# Patient Record
Sex: MALE | Race: BLACK OR AFRICAN AMERICAN | Employment: UNEMPLOYED | URBAN - METROPOLITAN AREA
[De-identification: names, ages, dates, MRNs, and addresses within clinical notes are randomized per-mention and may not be internally consistent; named-entity substitution may affect disease eponyms.]

---

## 2022-01-12 ENCOUNTER — HOSPITAL ENCOUNTER (INPATIENT)
Facility: HOSPITAL | Age: 42
LOS: 14 days | Discharge: NURSING, REHAB OR LONG-TERM CARE | DRG: 853 | End: 2022-01-27
Attending: EMERGENCY MEDICINE | Admitting: INTERNAL MEDICINE
Payer: MEDICAID

## 2022-01-12 DIAGNOSIS — T33.822A FROSTBITE OF BOTH FEET, INITIAL ENCOUNTER: Primary | ICD-10-CM

## 2022-01-12 DIAGNOSIS — T33.821A FROSTBITE OF BOTH FEET, INITIAL ENCOUNTER: Primary | ICD-10-CM

## 2022-01-12 DIAGNOSIS — I96 GANGRENE OF TOE OF BOTH FEET (HCC): ICD-10-CM

## 2022-01-12 DIAGNOSIS — I96 GANGRENE (HCC): ICD-10-CM

## 2022-01-12 LAB
ANION GAP SERPL CALC-SCNC: 8 MMOL/L (ref 0–18)
BUN BLD-MCNC: 7 MG/DL (ref 7–18)
BUN/CREAT SERPL: 10.1 (ref 10–20)
CALCIUM BLD-MCNC: 9.1 MG/DL (ref 8.5–10.1)
CHLORIDE SERPL-SCNC: 95 MMOL/L (ref 98–112)
CO2 SERPL-SCNC: 30 MMOL/L (ref 21–32)
CREAT BLD-MCNC: 0.69 MG/DL
GLUCOSE BLD-MCNC: 105 MG/DL (ref 70–99)
OSMOLALITY SERPL CALC.SUM OF ELEC: 274 MOSM/KG (ref 275–295)
POTASSIUM SERPL-SCNC: 3.2 MMOL/L (ref 3.5–5.1)
SARS-COV-2 RNA RESP QL NAA+PROBE: NOT DETECTED
SODIUM SERPL-SCNC: 133 MMOL/L (ref 136–145)

## 2022-01-13 ENCOUNTER — APPOINTMENT (OUTPATIENT)
Dept: CT IMAGING | Facility: HOSPITAL | Age: 42
DRG: 853 | End: 2022-01-13
Attending: NURSE PRACTITIONER
Payer: MEDICAID

## 2022-01-13 ENCOUNTER — APPOINTMENT (OUTPATIENT)
Dept: GENERAL RADIOLOGY | Facility: HOSPITAL | Age: 42
DRG: 853 | End: 2022-01-13
Attending: EMERGENCY MEDICINE
Payer: MEDICAID

## 2022-01-13 ENCOUNTER — APPOINTMENT (OUTPATIENT)
Dept: GENERAL RADIOLOGY | Facility: HOSPITAL | Age: 42
DRG: 853 | End: 2022-01-13
Attending: NURSE PRACTITIONER
Payer: MEDICAID

## 2022-01-13 ENCOUNTER — ANESTHESIA (OUTPATIENT)
Dept: SURGERY | Facility: HOSPITAL | Age: 42
DRG: 853 | End: 2022-01-13
Payer: MEDICAID

## 2022-01-13 ENCOUNTER — ANESTHESIA EVENT (OUTPATIENT)
Dept: SURGERY | Facility: HOSPITAL | Age: 42
DRG: 853 | End: 2022-01-13
Payer: MEDICAID

## 2022-01-13 ENCOUNTER — APPOINTMENT (OUTPATIENT)
Dept: GENERAL RADIOLOGY | Facility: HOSPITAL | Age: 42
DRG: 853 | End: 2022-01-13
Attending: PODIATRIST
Payer: MEDICAID

## 2022-01-13 PROBLEM — T33.822A FROSTBITE OF BOTH FEET, INITIAL ENCOUNTER: Status: ACTIVE | Noted: 2022-01-13

## 2022-01-13 PROBLEM — I96 GANGRENE OF TOE OF BOTH FEET (HCC): Status: ACTIVE | Noted: 2022-01-13

## 2022-01-13 PROBLEM — T33.821A FROSTBITE OF BOTH FEET, INITIAL ENCOUNTER: Status: ACTIVE | Noted: 2022-01-13

## 2022-01-13 LAB
ALBUMIN SERPL-MCNC: 2 G/DL (ref 3.4–5)
ALP LIVER SERPL-CCNC: 193 U/L
ALT SERPL-CCNC: 129 U/L
ANION GAP SERPL CALC-SCNC: 11 MMOL/L (ref 0–18)
APTT PPP: 35.9 SECONDS (ref 23.3–35.6)
AST SERPL-CCNC: 79 U/L (ref 15–37)
BASOPHILS # BLD: 0 X10(3) UL (ref 0–0.2)
BASOPHILS NFR BLD: 0 %
BILIRUB DIRECT SERPL-MCNC: 0.1 MG/DL (ref 0–0.2)
BILIRUB SERPL-MCNC: 0.5 MG/DL (ref 0.1–2)
BUN BLD-MCNC: 5 MG/DL (ref 7–18)
BUN/CREAT SERPL: 8.1 (ref 10–20)
CALCIUM BLD-MCNC: 8.9 MG/DL (ref 8.5–10.1)
CHLORIDE SERPL-SCNC: 101 MMOL/L (ref 98–112)
CO2 SERPL-SCNC: 24 MMOL/L (ref 21–32)
CREAT BLD-MCNC: 0.62 MG/DL
CRP SERPL-MCNC: 32 MG/DL (ref ?–0.3)
DEPRECATED HBV CORE AB SER IA-ACNC: 1570.6 NG/ML
DEPRECATED RDW RBC AUTO: 42.2 FL (ref 35.1–46.3)
DEPRECATED RDW RBC AUTO: 45.2 FL (ref 35.1–46.3)
EOSINOPHIL # BLD: 0.48 X10(3) UL (ref 0–0.7)
EOSINOPHIL NFR BLD: 2 %
ERYTHROCYTE [DISTWIDTH] IN BLOOD BY AUTOMATED COUNT: 13.9 % (ref 11–15)
ERYTHROCYTE [DISTWIDTH] IN BLOOD BY AUTOMATED COUNT: 14.5 % (ref 11–15)
EST. AVERAGE GLUCOSE BLD GHB EST-MCNC: 117 MG/DL (ref 68–126)
ETHANOL SERPL-MCNC: <3 MG/DL (ref ?–3)
GLUCOSE BLD-MCNC: 83 MG/DL (ref 70–99)
GLUCOSE BLDC GLUCOMTR-MCNC: 80 MG/DL (ref 70–99)
GLUCOSE BLDC GLUCOMTR-MCNC: 80 MG/DL (ref 70–99)
GLUCOSE BLDC GLUCOMTR-MCNC: 98 MG/DL (ref 70–99)
HBA1C MFR BLD: 5.7 % (ref ?–5.7)
HCT VFR BLD AUTO: 25.2 %
HCT VFR BLD AUTO: 26.1 %
HGB BLD-MCNC: 8.6 G/DL
HGB BLD-MCNC: 9.2 G/DL
HIV 1+2 AB+HIV1 P24 AG SERPL QL IA: NONREACTIVE
INR BLD: 1.2 (ref 0.8–1.2)
IRON SATN MFR SERPL: 14 %
IRON SERPL-MCNC: 23 UG/DL
LYMPHOCYTES NFR BLD: 0.72 X10(3) UL (ref 1–4)
LYMPHOCYTES NFR BLD: 3 %
MAGNESIUM SERPL-MCNC: 1.9 MG/DL (ref 1.6–2.6)
MCH RBC QN AUTO: 29.1 PG (ref 26–34)
MCH RBC QN AUTO: 29.3 PG (ref 26–34)
MCHC RBC AUTO-ENTMCNC: 34.1 G/DL (ref 31–37)
MCHC RBC AUTO-ENTMCNC: 35.2 G/DL (ref 31–37)
MCV RBC AUTO: 82.6 FL
MCV RBC AUTO: 85.7 FL
MONOCYTES # BLD: 1.21 X10(3) UL (ref 0.1–1)
MONOCYTES NFR BLD: 5 %
NEUTROPHILS # BLD AUTO: 21.59 X10 (3) UL (ref 1.5–7.7)
NEUTROPHILS NFR BLD: 86 %
NEUTS BAND NFR BLD: 4 %
NEUTS HYPERSEG # BLD: 21.69 X10(3) UL (ref 1.5–7.7)
OSMOLALITY SERPL CALC.SUM OF ELEC: 278 MOSM/KG (ref 275–295)
PLATELET # BLD AUTO: 539 10(3)UL (ref 150–450)
PLATELET # BLD AUTO: 556 10(3)UL (ref 150–450)
POTASSIUM SERPL-SCNC: 3.4 MMOL/L (ref 3.5–5.1)
PROCALCITONIN SERPL-MCNC: 1.05 NG/ML (ref ?–0.16)
PROT SERPL-MCNC: 7.2 G/DL (ref 6.4–8.2)
PROTHROMBIN TIME: 15.4 SECONDS (ref 11.6–14.8)
RBC # BLD AUTO: 2.94 X10(6)UL
RBC # BLD AUTO: 3.16 X10(6)UL
SODIUM SERPL-SCNC: 136 MMOL/L (ref 136–145)
TIBC SERPL-MCNC: 170 UG/DL (ref 240–450)
TOTAL CELLS COUNTED BLD: 100
TRANSFERRIN SERPL-MCNC: 114 MG/DL (ref 200–360)
VIT B12 SERPL-MCNC: 507 PG/ML (ref 193–986)
WBC # BLD AUTO: 20.4 X10(3) UL (ref 4–11)
WBC # BLD AUTO: 24.1 X10(3) UL (ref 4–11)

## 2022-01-13 PROCEDURE — 0Y6Q0Z0 DETACHMENT AT LEFT 1ST TOE, COMPLETE, OPEN APPROACH: ICD-10-PCS | Performed by: PODIATRIST

## 2022-01-13 PROCEDURE — 0Y6P0Z0 DETACHMENT AT RIGHT 1ST TOE, COMPLETE, OPEN APPROACH: ICD-10-PCS | Performed by: PODIATRIST

## 2022-01-13 PROCEDURE — 0Y6Y0Z0 DETACHMENT AT LEFT 5TH TOE, COMPLETE, OPEN APPROACH: ICD-10-PCS | Performed by: PODIATRIST

## 2022-01-13 PROCEDURE — 73630 X-RAY EXAM OF FOOT: CPT | Performed by: EMERGENCY MEDICINE

## 2022-01-13 PROCEDURE — 73630 X-RAY EXAM OF FOOT: CPT | Performed by: PODIATRIST

## 2022-01-13 PROCEDURE — 0Y6W0Z0 DETACHMENT AT LEFT 4TH TOE, COMPLETE, OPEN APPROACH: ICD-10-PCS | Performed by: PODIATRIST

## 2022-01-13 PROCEDURE — 0Y6U0Z0 DETACHMENT AT LEFT 3RD TOE, COMPLETE, OPEN APPROACH: ICD-10-PCS | Performed by: PODIATRIST

## 2022-01-13 PROCEDURE — 71045 X-RAY EXAM CHEST 1 VIEW: CPT | Performed by: NURSE PRACTITIONER

## 2022-01-13 PROCEDURE — 70450 CT HEAD/BRAIN W/O DYE: CPT | Performed by: NURSE PRACTITIONER

## 2022-01-13 PROCEDURE — 0Y6S0Z0 DETACHMENT AT LEFT 2ND TOE, COMPLETE, OPEN APPROACH: ICD-10-PCS | Performed by: PODIATRIST

## 2022-01-13 PROCEDURE — 0Y6X0Z0 DETACHMENT AT RIGHT 5TH TOE, COMPLETE, OPEN APPROACH: ICD-10-PCS | Performed by: PODIATRIST

## 2022-01-13 PROCEDURE — 99223 1ST HOSP IP/OBS HIGH 75: CPT | Performed by: OTHER

## 2022-01-13 RX ORDER — PANTOPRAZOLE SODIUM 40 MG/1
40 TABLET, DELAYED RELEASE ORAL
Status: DISCONTINUED | OUTPATIENT
Start: 2022-01-13 | End: 2022-01-21

## 2022-01-13 RX ORDER — BUPIVACAINE HYDROCHLORIDE 5 MG/ML
INJECTION, SOLUTION EPIDURAL; INTRACAUDAL AS NEEDED
Status: DISCONTINUED | OUTPATIENT
Start: 2022-01-13 | End: 2022-01-13 | Stop reason: HOSPADM

## 2022-01-13 RX ORDER — VANCOMYCIN HYDROCHLORIDE
25 ONCE
Status: COMPLETED | OUTPATIENT
Start: 2022-01-13 | End: 2022-01-13

## 2022-01-13 RX ORDER — NALOXONE HYDROCHLORIDE 0.4 MG/ML
80 INJECTION, SOLUTION INTRAMUSCULAR; INTRAVENOUS; SUBCUTANEOUS AS NEEDED
Status: DISCONTINUED | OUTPATIENT
Start: 2022-01-13 | End: 2022-01-13 | Stop reason: HOSPADM

## 2022-01-13 RX ORDER — ONDANSETRON 2 MG/ML
4 INJECTION INTRAMUSCULAR; INTRAVENOUS ONCE AS NEEDED
Status: DISCONTINUED | OUTPATIENT
Start: 2022-01-13 | End: 2022-01-13 | Stop reason: HOSPADM

## 2022-01-13 RX ORDER — HALOPERIDOL 5 MG/ML
0.25 INJECTION INTRAMUSCULAR ONCE AS NEEDED
Status: DISCONTINUED | OUTPATIENT
Start: 2022-01-13 | End: 2022-01-13 | Stop reason: HOSPADM

## 2022-01-13 RX ORDER — LORAZEPAM 2 MG/ML
1 INJECTION INTRAMUSCULAR
Status: DISCONTINUED | OUTPATIENT
Start: 2022-01-13 | End: 2022-01-27

## 2022-01-13 RX ORDER — HYDROMORPHONE HYDROCHLORIDE 1 MG/ML
0.2 INJECTION, SOLUTION INTRAMUSCULAR; INTRAVENOUS; SUBCUTANEOUS EVERY 5 MIN PRN
Status: DISCONTINUED | OUTPATIENT
Start: 2022-01-13 | End: 2022-01-13 | Stop reason: HOSPADM

## 2022-01-13 RX ORDER — MELATONIN
100 DAILY
Status: DISCONTINUED | OUTPATIENT
Start: 2022-01-14 | End: 2022-01-27

## 2022-01-13 RX ORDER — ALBUTEROL SULFATE 90 UG/1
2 AEROSOL, METERED RESPIRATORY (INHALATION) EVERY 4 HOURS PRN
Status: DISCONTINUED | OUTPATIENT
Start: 2022-01-13 | End: 2022-01-27

## 2022-01-13 RX ORDER — HYDROCODONE BITARTRATE AND ACETAMINOPHEN 5; 325 MG/1; MG/1
2 TABLET ORAL AS NEEDED
Status: DISCONTINUED | OUTPATIENT
Start: 2022-01-13 | End: 2022-01-13 | Stop reason: HOSPADM

## 2022-01-13 RX ORDER — HYDROMORPHONE HYDROCHLORIDE 1 MG/ML
0.6 INJECTION, SOLUTION INTRAMUSCULAR; INTRAVENOUS; SUBCUTANEOUS EVERY 5 MIN PRN
Status: DISCONTINUED | OUTPATIENT
Start: 2022-01-13 | End: 2022-01-13 | Stop reason: HOSPADM

## 2022-01-13 RX ORDER — MORPHINE SULFATE 4 MG/ML
4 INJECTION, SOLUTION INTRAMUSCULAR; INTRAVENOUS EVERY 10 MIN PRN
Status: DISCONTINUED | OUTPATIENT
Start: 2022-01-13 | End: 2022-01-13 | Stop reason: HOSPADM

## 2022-01-13 RX ORDER — GENTAMICIN SULFATE 40 MG/ML
INJECTION, SOLUTION INTRAMUSCULAR; INTRAVENOUS AS NEEDED
Status: DISCONTINUED | OUTPATIENT
Start: 2022-01-13 | End: 2022-01-13 | Stop reason: HOSPADM

## 2022-01-13 RX ORDER — SODIUM CHLORIDE 9 MG/ML
INJECTION, SOLUTION INTRAVENOUS CONTINUOUS
Status: DISCONTINUED | OUTPATIENT
Start: 2022-01-13 | End: 2022-01-13

## 2022-01-13 RX ORDER — ALBUTEROL SULFATE 90 UG/1
2 AEROSOL, METERED RESPIRATORY (INHALATION) EVERY 6 HOURS PRN
COMMUNITY

## 2022-01-13 RX ORDER — HYDROMORPHONE HYDROCHLORIDE 1 MG/ML
0.4 INJECTION, SOLUTION INTRAMUSCULAR; INTRAVENOUS; SUBCUTANEOUS EVERY 5 MIN PRN
Status: DISCONTINUED | OUTPATIENT
Start: 2022-01-13 | End: 2022-01-13 | Stop reason: HOSPADM

## 2022-01-13 RX ORDER — GUAIFENESIN 100 MG/5ML
100 SOLUTION ORAL EVERY 4 HOURS PRN
Status: DISCONTINUED | OUTPATIENT
Start: 2022-01-13 | End: 2022-01-27

## 2022-01-13 RX ORDER — SODIUM CHLORIDE 9 MG/ML
INJECTION, SOLUTION INTRAVENOUS ONCE
Status: COMPLETED | OUTPATIENT
Start: 2022-01-13 | End: 2022-01-13

## 2022-01-13 RX ORDER — MULTIPLE VITAMINS W/ MINERALS TAB 9MG-400MCG
1 TAB ORAL DAILY
Status: DISCONTINUED | OUTPATIENT
Start: 2022-01-13 | End: 2022-01-27

## 2022-01-13 RX ORDER — LORAZEPAM 2 MG/ML
2 INJECTION INTRAMUSCULAR
Status: DISCONTINUED | OUTPATIENT
Start: 2022-01-13 | End: 2022-01-27

## 2022-01-13 RX ORDER — MORPHINE SULFATE 10 MG/ML
6 INJECTION, SOLUTION INTRAMUSCULAR; INTRAVENOUS EVERY 10 MIN PRN
Status: DISCONTINUED | OUTPATIENT
Start: 2022-01-13 | End: 2022-01-13 | Stop reason: HOSPADM

## 2022-01-13 RX ORDER — LORAZEPAM 2 MG/1
2 TABLET ORAL
Status: DISCONTINUED | OUTPATIENT
Start: 2022-01-13 | End: 2022-01-27

## 2022-01-13 RX ORDER — ACETAMINOPHEN 325 MG/1
650 TABLET ORAL EVERY 6 HOURS PRN
Status: DISCONTINUED | OUTPATIENT
Start: 2022-01-13 | End: 2022-01-14

## 2022-01-13 RX ORDER — PROCHLORPERAZINE EDISYLATE 5 MG/ML
5 INJECTION INTRAMUSCULAR; INTRAVENOUS ONCE AS NEEDED
Status: DISCONTINUED | OUTPATIENT
Start: 2022-01-13 | End: 2022-01-13 | Stop reason: HOSPADM

## 2022-01-13 RX ORDER — LORAZEPAM 1 MG/1
1 TABLET ORAL
Status: DISCONTINUED | OUTPATIENT
Start: 2022-01-13 | End: 2022-01-27

## 2022-01-13 RX ORDER — LIDOCAINE HYDROCHLORIDE 10 MG/ML
INJECTION, SOLUTION EPIDURAL; INFILTRATION; INTRACAUDAL; PERINEURAL AS NEEDED
Status: DISCONTINUED | OUTPATIENT
Start: 2022-01-13 | End: 2022-01-13 | Stop reason: HOSPADM

## 2022-01-13 RX ORDER — MORPHINE SULFATE 4 MG/ML
2 INJECTION, SOLUTION INTRAMUSCULAR; INTRAVENOUS EVERY 10 MIN PRN
Status: DISCONTINUED | OUTPATIENT
Start: 2022-01-13 | End: 2022-01-13 | Stop reason: HOSPADM

## 2022-01-13 RX ORDER — SODIUM CHLORIDE, SODIUM LACTATE, POTASSIUM CHLORIDE, CALCIUM CHLORIDE 600; 310; 30; 20 MG/100ML; MG/100ML; MG/100ML; MG/100ML
INJECTION, SOLUTION INTRAVENOUS CONTINUOUS PRN
Status: DISCONTINUED | OUTPATIENT
Start: 2022-01-13 | End: 2022-01-13 | Stop reason: SURG

## 2022-01-13 RX ORDER — SODIUM CHLORIDE, SODIUM LACTATE, POTASSIUM CHLORIDE, CALCIUM CHLORIDE 600; 310; 30; 20 MG/100ML; MG/100ML; MG/100ML; MG/100ML
INJECTION, SOLUTION INTRAVENOUS CONTINUOUS
Status: DISCONTINUED | OUTPATIENT
Start: 2022-01-13 | End: 2022-01-13 | Stop reason: HOSPADM

## 2022-01-13 RX ORDER — FOLIC ACID 1 MG/1
1 TABLET ORAL DAILY
Status: DISCONTINUED | OUTPATIENT
Start: 2022-01-13 | End: 2022-01-27

## 2022-01-13 RX ORDER — MIDAZOLAM HYDROCHLORIDE 1 MG/ML
INJECTION INTRAMUSCULAR; INTRAVENOUS AS NEEDED
Status: DISCONTINUED | OUTPATIENT
Start: 2022-01-13 | End: 2022-01-13 | Stop reason: SURG

## 2022-01-13 RX ORDER — HYDROCODONE BITARTRATE AND ACETAMINOPHEN 5; 325 MG/1; MG/1
1 TABLET ORAL AS NEEDED
Status: DISCONTINUED | OUTPATIENT
Start: 2022-01-13 | End: 2022-01-13 | Stop reason: HOSPADM

## 2022-01-13 RX ORDER — DEXTROSE MONOHYDRATE 25 G/50ML
50 INJECTION, SOLUTION INTRAVENOUS
Status: DISCONTINUED | OUTPATIENT
Start: 2022-01-13 | End: 2022-01-14

## 2022-01-13 RX ORDER — LEVETIRACETAM 500 MG/1
1000 TABLET ORAL 2 TIMES DAILY
Status: DISCONTINUED | OUTPATIENT
Start: 2022-01-13 | End: 2022-01-27

## 2022-01-13 RX ADMIN — MIDAZOLAM HYDROCHLORIDE 1 MG: 1 INJECTION INTRAMUSCULAR; INTRAVENOUS at 08:51:00

## 2022-01-13 RX ADMIN — MIDAZOLAM HYDROCHLORIDE 1 MG: 1 INJECTION INTRAMUSCULAR; INTRAVENOUS at 08:48:00

## 2022-01-13 RX ADMIN — SODIUM CHLORIDE, SODIUM LACTATE, POTASSIUM CHLORIDE, CALCIUM CHLORIDE: 600; 310; 30; 20 INJECTION, SOLUTION INTRAVENOUS at 08:44:00

## 2022-01-13 NOTE — ED PROVIDER NOTES
Patient Seen in: [facility] AND [facility] Emergency Department      History   Patient presents with:  Exposure to Cold    Stated Complaint:     Subjective:   HPI    59-year-old male reportedly newly diabetic with asthma and history of seizure disorder here for atraumatic. Eyes: Conjunctivae are normal. Pupils are equal, round, and reactive to light. Neck: Normal range of motion. Neck supple. Cardiovascular: Normal rate, regular rhythm and intact distal pulses.     Pulmonary/Chest: Effort normal. No respirat Notable for the following components:    WBC 24.1 (*)     RBC 3.16 (*)     HGB 9.2 (*)     HCT 26.1 (*)     .0 (*)     Neutrophil Absolute Prelim 21.59 (*)     All other components within normal limits   RAPID SARS-COV-2 BY PCR - Normal   CBC WITH D concern now though for worsening swelling and possibly development of a secondary infection. I spoke with [place] [facility] [place] who recommended podiatry consultation in the morning. The patient is stable. I reviewed his care everywhere you have I chart.

## 2022-01-13 NOTE — ED QUICK NOTES
All of left toes black necrotic, open wound noted to base of left toes. Pt with no sensation in left toes, decreased sensation to top of left foot, pt reports pain sensation to any touch to left ankle, normal sensation about the left ankle.  Right great toe

## 2022-01-13 NOTE — OPERATIVE REPORT
Surgeon: Christel Kennedy  Assistant: None  Preop Diagnosis: gangrene right foot and gangrene leftfoot  Post op Diagnosis: Same  Procedure:  Incision and Drainage if right and left foot with amputation of 1,2,3,4,5 digits left foot and amputation 1,5 digits rig plantar aspect of the 1st innerspace where a 3cm stab incision was made to drain any further purulence. Minimal purulence was noted.   A freer was used as a probe to extend from the plantar incision to the dorsum of the forefoot and again pressure was appl the forefoot and again pressure was applied and no further purulence was noted.   Another 3 cm stab incision was made in the 4th innerspace at the level of the MT shaft plantar aspect of the foot and a freer was used as a probe to extend from the plantar in

## 2022-01-13 NOTE — ED QUICK NOTES
Orders for admission, patient is aware of plan and ready to go upstairs.  Any questions, please call ED RN Linwood Doshi at extension [phone]     Patient Covid vaccination status: Unvaccinated     COVID Test Ordered in ED: Rapid SARS-CoV-2 by PCR    COVID Suspicion a

## 2022-01-13 NOTE — OPERATIVE REPORT
Pre-Operative Diagnosis: Gangrene ([facility] [address] [I96]     Post-Operative Diagnosis: Gangrene ([facility] [address] Laura Landa      Procedure Performed:   Incision and drainage with excision of all infected bone and soft tisssue left and right foot with amputation of digits 1,2,3,4,5 o

## 2022-01-13 NOTE — ANESTHESIA POSTPROCEDURE EVALUATION
Patient: Nikky Gerardo    Procedure Summary     Date: 01/13/22 Room / Location: [address] [place] [address] MAIN OR 09 / [address] [place] [address] OR    Anesthesia Start: 7767 Anesthesia Stop:     Procedure:  Incision and drainage with excision of all infected bone and soft tisssue left and right

## 2022-01-13 NOTE — WOUND PROGRESS NOTE
Spoke to bedside RN to advise pt is being taken to surgery with Dr. Guilherme Stover. A nursing consult was placed for wound care to foot. Wound care is available if surgeon requests.

## 2022-01-13 NOTE — PLAN OF CARE
Pt admitted onto floor this morning. Pt will be going down to OR for sx, no complaints of pain or discomfort. No other acute changes during shift.       Problem: Patient Centered Care  Goal: Patient preferences are identified and integrated in the patient's Assess pain using appropriate pain scale  - Administer analgesics based on type and severity of pain and evaluate response  - Implement non-pharmacological measures as appropriate and evaluate response  - Consider cultural and social influences on pain and for pressure ulcer development  - Assess and document skin integrity  - Assess and document dressing/incision, wound bed, drain sites and surrounding tissue  - Implement wound care per orders  - Initiate isolation precautions as appropriate  - Initiate Pre

## 2022-01-13 NOTE — PLAN OF CARE
Pt received from surgery with no complications. Surgical dressings to BL feet clean/dry/intact. IV abx administered as ordered. CIWA protocol in place, monitored per protocol. Seizure precautions in place. Pt resting comfortably, call light within reach. stated pain goal  Description: INTERVENTIONS:  - Encourage pt to monitor pain and request assistance  - Assess pain using appropriate pain scale  - Administer analgesics based on type and severity of pain and evaluate response  - Implement non-pharmacologi site(s) healing without S/S of infection  Description: INTERVENTIONS:  - Assess and document risk factors for pressure ulcer development  - Assess and document skin integrity  - Assess and document dressing/incision, wound bed, drain sites and surrounding

## 2022-01-13 NOTE — CM/SW NOTE
APN order for outpt IV abx. Per RN in dc rounds pt went for emergent surgery this am. Per Op note bilat toes were amputated, 1-5 on the left and 1&5 on the right. ID, Vascular and wound consult pending.      Per chart review pt was dc from [facility] 5 days
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
## 2022-01-13 NOTE — ANESTHESIA PREPROCEDURE EVALUATION
Anesthesia PreOp Note    HPI:     Becky Burns is a 39year old male who presents for preoperative consultation requested by: Delmy Hutton DPM    Date of Surgery: 1/12/2022 - 1/13/2022    Procedure(s):  EXTREMITY LOWER INCISION & DRAINAGE  Indicat MD Justin Mustafa Hold] Insulin Aspart Pen (NOVOLOG) 100 UNIT/ML flexpen 1-7 Units, 1-7 Units, Subcutaneous, TID CC, Brenna Govea MD  vancomycin (VANCOCIN) 1,000 mg in sodium chloride 0.9% 250 mL IVPB-ADDV, 1,000 mg, Intravenous, Q12H, [place] HGB 9.2 (L) 01/12/2022    HCT 26.1 (L) 01/12/2022    MCV 82.6 01/12/2022    MCH 29.1 01/12/2022    MCHC 35.2 01/12/2022    RDW 13.9 01/12/2022    .0 (H) 01/12/2022     Lab Results   Component Value Date     (L) 01/12/2022    K 3.2 (L) 01/12/20 Discussed with pt , podiatrist,and DULY MD pt status   Pt will need further evaluation and treatment,but now is required emergency Tx of machado bite   Dr Comfort Conti will attempt local with our presence in OR  If MAC or GA will be required pt and his lsy

## 2022-01-13 NOTE — PAYOR COMM NOTE
--------------  ADMISSION REVIEW     Payor: [facility]/ICP  Subscriber #:  [id number]  Authorization Number: [id number]    Admit date: 1/13/22  Admit time:  3:51 AM       History   Patient presents with:  Exposure to Cold    15-year-old male repor developing gangrene of the right second and third toes. There is Dune addition of skin of multiple toes on both feet slightly again worse on the left than the right.   The wound margins proximally over the dorsal and plantar feet appear quite well actually stable. I reviewed his care everywhere you have I chart. It does not appear he left on antibiotics. I spoke w/ Dr. Mati Velasco from Podiatry who req'd foot x-rays, npo status, MRI's of both feet and agrees w/ ID consultation.   I spoke w/ ID Dr. Mecca Rodriguez who presents with:  Exposure to Cold     History of Present Illness:   History obtained from patient and mother Silver Linda. Patient was recently released from [facility] after admission for COVID starvation ketoacidosis and potential frostbite to his feet.   He was disc superimposed gas forming soft tissue infection not excluded. Surgical consultation advised. No definite radiographic evidence to suggest osteomyelitis.   Lucency within the navicular possibly reflecting age-indeterminate nondisplaced fracture versus artif (Left Foot) Sherman Costa DPM      Gentamicin Sulfate (GARAMYCIN) 40 MG/ML injection     Date Action Dose Route User    1/13/2022 0946 Given 80 mg Intramuscular (Right Foot) Sherman Costa DPM      lactated ringers infusion     Date Action Javid Meyer minerals (Thera M Plus) tab 1 tablet     Date Action Dose Route User    1/13/2022 1228 Given 1 tablet Oral Terri Chiang, RN      Thiamine HCl 100 mg in sodium chloride 0.9% 50 mL IVPB     Date Action Dose Route User    1/13/2022 1229 New Bag 100 mg Intr

## 2022-01-13 NOTE — PROGRESS NOTES
[facility] Dosing Service    Initial Pharmacokinetic Consult for Vancomycin AUC Dosing    Tyler Herron is a 39year old patient who is being treated for cellulitis. Pharmacy has been asked to dose vancomycin by Dr Mana Paz.     Weights:  Ideal body weig

## 2022-01-13 NOTE — CONSULTS
[facility]    Report of Consultation    Paulette Gandhi Patient Status:  Inpatient    1980 MRN [id number]   Location [facility] 5SW/SE Attending Sorin Rees DO   Hosp Day # 0 PCP None Pcp     Date of Admission:  2022  D Intravenous, Q15 Min PRN   Or  glucose (DEX4) oral liquid 30 g, 30 g, Oral, Q15 Min PRN   Or  glucose-vitamin C (DEX-4) chewable tab 8 tablet, 8 tablet, Oral, Q15 Min PRN  Insulin Aspart Pen (NOVOLOG) 100 UNIT/ML flexpen 1-7 Units, 1-7 Units, Subcutaneous, foot: blackened hard cold digits 1,2,3,4,5 with no CFT, mummified necrotic digits with foul odor and bogginess to the plantar medial arch. Extensive subcuticular skin exposed plantar foot.  Dorsum of foot with medial dorsalaspect of foot exposed to [address] 1.1 cm nonspecific lucency suggested in the navicular. Correlation with any previous imaging would be of benefit to assess for stability.   Finding may be further evaluated with nonemergent follow-up MRI of the foot without with contrast.  A preliminary re

## 2022-01-14 ENCOUNTER — APPOINTMENT (OUTPATIENT)
Dept: ULTRASOUND IMAGING | Facility: HOSPITAL | Age: 42
DRG: 853 | End: 2022-01-14
Attending: NURSE PRACTITIONER
Payer: MEDICAID

## 2022-01-14 PROBLEM — T33.821A FROSTBITE OF FEET, BILATERAL: Status: RESOLVED | Noted: 2022-01-12 | Resolved: 2022-01-14

## 2022-01-14 PROBLEM — T33.822A FROSTBITE OF FEET, BILATERAL: Status: RESOLVED | Noted: 2022-01-12 | Resolved: 2022-01-14

## 2022-01-14 LAB
ALBUMIN SERPL-MCNC: 1.6 G/DL (ref 3.4–5)
ALP LIVER SERPL-CCNC: 139 U/L
ALT SERPL-CCNC: 66 U/L
ANION GAP SERPL CALC-SCNC: 7 MMOL/L (ref 0–18)
ANION GAP SERPL CALC-SCNC: 9 MMOL/L (ref 0–18)
AST SERPL-CCNC: 37 U/L (ref 15–37)
BILIRUB DIRECT SERPL-MCNC: 0.2 MG/DL (ref 0–0.2)
BILIRUB SERPL-MCNC: 0.5 MG/DL (ref 0.1–2)
BILIRUB UR QL: NEGATIVE
BUN BLD-MCNC: 14 MG/DL (ref 7–18)
BUN BLD-MCNC: 16 MG/DL (ref 7–18)
BUN/CREAT SERPL: 4 (ref 10–20)
BUN/CREAT SERPL: 4.2 (ref 10–20)
CALCIUM BLD-MCNC: 8.5 MG/DL (ref 8.5–10.1)
CALCIUM BLD-MCNC: 8.8 MG/DL (ref 8.5–10.1)
CHLORIDE SERPL-SCNC: 103 MMOL/L (ref 98–112)
CHLORIDE SERPL-SCNC: 104 MMOL/L (ref 98–112)
CO2 SERPL-SCNC: 25 MMOL/L (ref 21–32)
CO2 SERPL-SCNC: 25 MMOL/L (ref 21–32)
COLOR UR: YELLOW
CREAT BLD-MCNC: 3.35 MG/DL
CREAT BLD-MCNC: 4.01 MG/DL
DEPRECATED RDW RBC AUTO: 43.7 FL (ref 35.1–46.3)
ERYTHROCYTE [DISTWIDTH] IN BLOOD BY AUTOMATED COUNT: 14.3 % (ref 11–15)
ERYTHROCYTE [SEDIMENTATION RATE] IN BLOOD: 116 MM/HR
GLUCOSE BLD-MCNC: 70 MG/DL (ref 70–99)
GLUCOSE BLD-MCNC: 96 MG/DL (ref 70–99)
GLUCOSE UR-MCNC: NEGATIVE MG/DL
HCT VFR BLD AUTO: 26.7 %
HGB BLD-MCNC: 9.1 G/DL
HGB UR QL STRIP.AUTO: NEGATIVE
KETONES UR-MCNC: NEGATIVE MG/DL
MCH RBC QN AUTO: 28.5 PG (ref 26–34)
MCHC RBC AUTO-ENTMCNC: 34.1 G/DL (ref 31–37)
MCV RBC AUTO: 83.7 FL
NITRITE UR QL STRIP.AUTO: NEGATIVE
OSMOLALITY SERPL CALC.SUM OF ELEC: 283 MOSM/KG (ref 275–295)
OSMOLALITY SERPL CALC.SUM OF ELEC: 283 MOSM/KG (ref 275–295)
OSMOLALITY UR: 105 MOSM/KG (ref 300–1100)
PH UR: 6 [PH] (ref 5–8)
PLATELET # BLD AUTO: 526 10(3)UL (ref 150–450)
POTASSIUM SERPL-SCNC: 3.8 MMOL/L (ref 3.5–5.1)
POTASSIUM SERPL-SCNC: 3.8 MMOL/L (ref 3.5–5.1)
POTASSIUM SERPL-SCNC: 4 MMOL/L (ref 3.5–5.1)
PROT SERPL-MCNC: 5.1 G/DL (ref 6.4–8.2)
PROT UR-MCNC: 100 MG/DL
RBC # BLD AUTO: 3.19 X10(6)UL
SODIUM SERPL-SCNC: 136 MMOL/L (ref 136–145)
SODIUM SERPL-SCNC: 137 MMOL/L (ref 136–145)
SP GR UR STRIP: 1.01 (ref 1–1.03)
UROBILINOGEN UR STRIP-ACNC: <2
VANCOMYCIN TROUGH SERPL-MCNC: 40 UG/ML (ref 10–20)
WBC # BLD AUTO: 14.5 X10(3) UL (ref 4–11)

## 2022-01-14 PROCEDURE — 76770 US EXAM ABDO BACK WALL COMP: CPT | Performed by: NURSE PRACTITIONER

## 2022-01-14 RX ORDER — ENOXAPARIN SODIUM 100 MG/ML
40 INJECTION SUBCUTANEOUS DAILY
Status: DISCONTINUED | OUTPATIENT
Start: 2022-01-14 | End: 2022-01-14

## 2022-01-14 RX ORDER — LEVETIRACETAM 1000 MG/1
1000 TABLET ORAL 2 TIMES DAILY
Refills: 0 | Status: SHIPPED | COMMUNITY
Start: 2022-01-14

## 2022-01-14 RX ORDER — FOLIC ACID 1 MG/1
1 TABLET ORAL DAILY
Refills: 0 | Status: SHIPPED | COMMUNITY
Start: 2022-01-15

## 2022-01-14 RX ORDER — PANTOPRAZOLE SODIUM 40 MG/1
40 TABLET, DELAYED RELEASE ORAL
Refills: 0 | Status: SHIPPED | COMMUNITY
Start: 2022-01-15 | End: 2022-01-27

## 2022-01-14 RX ORDER — HEPARIN SODIUM 5000 [USP'U]/ML
5000 INJECTION, SOLUTION INTRAVENOUS; SUBCUTANEOUS EVERY 8 HOURS
Refills: 0 | Status: SHIPPED | COMMUNITY
Start: 2022-01-14 | End: 2022-01-27

## 2022-01-14 RX ORDER — MELATONIN
100 DAILY
Refills: 0 | Status: SHIPPED | COMMUNITY
Start: 2022-01-15

## 2022-01-14 RX ORDER — ACETAMINOPHEN 325 MG/1
650 TABLET ORAL EVERY 8 HOURS
Refills: 0 | Status: SHIPPED | COMMUNITY
Start: 2022-01-14

## 2022-01-14 RX ORDER — TAZOBACTAM SODIUM AND PIPERACILLIN SODIUM 375; 3 MG/50ML; G/50ML
3.38 INJECTION, SOLUTION INTRAVENOUS EVERY 8 HOURS
Refills: 0 | COMMUNITY
Start: 2022-01-14 | End: 2022-01-27

## 2022-01-14 RX ORDER — ENOXAPARIN SODIUM 100 MG/ML
40 INJECTION SUBCUTANEOUS EVERY 24 HOURS
Status: DISCONTINUED | OUTPATIENT
Start: 2022-01-14 | End: 2022-01-14

## 2022-01-14 RX ORDER — ENOXAPARIN SODIUM 100 MG/ML
30 INJECTION SUBCUTANEOUS EVERY 24 HOURS
Status: DISCONTINUED | OUTPATIENT
Start: 2022-01-14 | End: 2022-01-14

## 2022-01-14 RX ORDER — ACETAMINOPHEN 325 MG/1
650 TABLET ORAL EVERY 8 HOURS
Status: DISCONTINUED | OUTPATIENT
Start: 2022-01-14 | End: 2022-01-27

## 2022-01-14 RX ORDER — SODIUM CHLORIDE 9 MG/ML
INJECTION, SOLUTION INTRAVENOUS CONTINUOUS
Status: DISCONTINUED | OUTPATIENT
Start: 2022-01-14 | End: 2022-01-15

## 2022-01-14 RX ORDER — ENOXAPARIN SODIUM 100 MG/ML
40 INJECTION SUBCUTANEOUS EVERY 24 HOURS
Refills: 0 | Status: SHIPPED | COMMUNITY
Start: 2022-01-14 | End: 2022-01-27

## 2022-01-14 RX ORDER — ACETAMINOPHEN 325 MG/1
650 TABLET ORAL EVERY 8 HOURS
Status: DISCONTINUED | OUTPATIENT
Start: 2022-01-14 | End: 2022-01-14

## 2022-01-14 RX ORDER — MULTIPLE VITAMINS W/ MINERALS TAB 9MG-400MCG
1 TAB ORAL DAILY
Qty: 30 TABLET | Refills: 0 | Status: SHIPPED | OUTPATIENT
Start: 2022-01-15

## 2022-01-14 RX ORDER — HEPARIN SODIUM 5000 [USP'U]/ML
5000 INJECTION, SOLUTION INTRAVENOUS; SUBCUTANEOUS EVERY 8 HOURS
Status: DISCONTINUED | OUTPATIENT
Start: 2022-01-14 | End: 2022-01-27

## 2022-01-14 RX ORDER — SODIUM CHLORIDE 9 MG/ML
INJECTION, SOLUTION INTRAVENOUS CONTINUOUS
Refills: 0 | Status: SHIPPED | COMMUNITY
Start: 2022-01-14 | End: 2022-01-27

## 2022-01-14 NOTE — PLAN OF CARE
Patient adamantly refusing placement of peterson catheter, md aware.       Problem: Patient Centered Care  Goal: Patient preferences are identified and integrated in the patient's plan of care  Description: Interventions:  - What would you like us to know as w appropriate  Outcome: Progressing     Problem: RISK FOR INFECTION - ADULT  Goal: Absence of fever/infection during anticipated neutropenic period  Description: INTERVENTIONS  - Monitor WBC  - Administer growth factors as ordered  - Implement neutropenic gu and empty bladder  - Monitor intake/output and perform bladder scan as needed  - Follow urinary retention protocol/standard of care  - Consider collaborating with pharmacy to review patient's medication profile  - Implement strategies to promote bladder em

## 2022-01-14 NOTE — CONSULTS
[address] [facility] Infectious Disease  Report of Consultation    Sae Price Patient Status:  Inpatient    1980 MRN [id number]   Location [facility] 5SW/SE Attending Eugenia Rivero,    Hosp Day # 0 PCP None Pcp (TYLENOL) tab 650 mg, 650 mg, Oral, Q6H PRN  •  Piperacillin Sod-Tazobactam So (ZOSYN) 3.375 g in dextrose 5% 100 mL IVPB-MBP, 3.375 g, Intravenous, Q8H  •  glucose (DEX4) oral liquid 15 g, 15 g, Oral, Q15 Min PRN **OR** glucose-vitamin C (DEX-4) chewable No dysuria, hematuria, urine urgency or frequency. Integument/breast: Negative for rash, skin lesions, and pruritus. Hematologic/lymphatic: Negative for easy bruising, bleeding, and lymphadenopathy.    Musculoskeletal: Pain and swelling in b/l feet with conjunctival     hemorrhage. Nose: Nares normal.   Throat:  Oropharynx clear, MMs moist.   Neck: Trachea ML, no masses. Lungs: CTA b/l no rhonchi, rales, wheezes. Chest wall: No tenderness or deformity.    Heart: Regular rate and rhythm, normal S1S2, evaluated with nonemergent follow-up MRI of the foot without with contrast.    Assessment and Plan:    1.   Evolving sepsis with leukocytosis and complicated SSTI source with evolving gangrene to toes  - Started as b/l frostbite injury in this gentleman wit
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
## 2022-01-14 NOTE — CONSULTS
Pebbles Salcedo MD.  [facility]   Vascular and Endovascular Surgery     VASCULAR SURGERY   CONSULT NOTE      Name: Kristine Neves   :   1980  [id number]     2022       REFERRING PHYSICIAN: Aleida Winkler DO  PRIMARY CARE PHYSICIAN injection 2 mg, 2 mg, Intravenous, Q1H PRN  •  thiamine (Vitamin B-1) tab 100 mg, 100 mg, Oral, Daily  •  multivitamin with minerals (Thera M Plus) tab 1 tablet, 1 tablet, Oral, Daily  •  folic acid (FOLVITE) tab 1 mg, 1 mg, Oral, Daily  •  pantoprazole (P 9.1*   MCV 82.6 85.7  --  83.7   .0* 556.0*  --  526.0*   BAND 4  --   --   --    INR  --   --  1.20  --        Recent Labs   Lab 01/12/22  2304 01/13/22  0830 01/14/22  0546   * 136 137   K 3.2* 3.4* 3.8  3.8   CL 95* 101 103   CO2 30.0 24.0 Correlate clinically. EFFUSION: None visible. OTHER: Negative. CONCLUSION:  1. Since the previous study, there has been resection of the 1st through 5th toes. No well-defined evidence of osteomyelitis.   Chronic appearing irregularity of the navicu excluded. Surgical consultation advised. No definite radiographic evidence to suggest osteomyelitis. Lucency within the navicular possibly reflecting age-indeterminate nondisplaced fracture versus artifact.   A preliminary report was issued by the [employer] given the history of frostbite injury. Correlate clinically. Moderate dorsal and plantar soft tissue swelling of the midfoot and forefoot.     Dictated by (CST): Crystal Crum MD on 1/13/2022 at 1:23 PM     Finalized by (CST): Miriam Boo (CPT=70450)  COMPARISON: None. INDICATIONS: Prior subdural hematoma surgical evacuation. Seizure disorder. TECHNIQUE: CT images were obtained without contrast material.  Automated exposure control for dose reduction was used.   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 He has palpable pulses and adequate perfusion for wound healing. Care of the foot per podiatry.  Will follow along peripher

## 2022-01-14 NOTE — COVID NURSING ASSESSMENT
COVID-19 Daily Discharge Readiness-Nursing    O2 Sat at Rest:     98% on RA   O2 Sat with Exertion:   NA; on bedrest, on RA  Temperature max from last 24 hrs: Temp (24hrs), Av °F (36.7 °C), Min:97.4 °F (36.3 °C), Max:98.8 °F (37.1 °C)    Inflammatory M

## 2022-01-14 NOTE — DISCHARGE SUMMARY
[facility] Hospitalist Discharge Summary   Patient ID:  Jessica Crespo  [id number]  39year old  12/5/1980    Admit date: 1/12/2022  Discharge date: 1/14/2022    Primary Care Physician: None Pcp   Attending Physician: Torrey Nath DO   Consults: children. Epic reviewed from records from [facility]'s several admissions. Patient denies any chest pain, shortness of breath, wheezing, abdominal pain, nausea, vomiting, diarrhea.     Hospital Course:     39 y.o. male with who lives with his mother and is inter  Inhalers  -abx-zosyn   -on RA  -follow up imaging recommended     ETOH Abuse/Use  Elevated LFT's  Hepatic Steatosis   Hepatomegaly  -see CT A/P at [facility] on 1/3/2022  -Alcohol level Tadeo Maloney when last drink was, per patient he drinks vodka occas plantar aspect of the forefoot which in part may represent postsurgical changes, but I suspect there has been progression  of the emphysematous process which may suggest tissue necrosis or aggressive infection to involve the distal and somewhat dorsal aspe Dictated by (CST): Wilfrid Alvarado MD on 1/13/2022 at 1:23 PM     Finalized by (CST): Wilfrid Alvarado MD on 1/13/2022 at 1:28 PM          XR FOOT, COMPLETE (MIN 3 VIEWS), RIGHT (CPT=73630)    Result Date: 1/13/2022  CONCLUSION:   Subtle forefoot lucency soft with minerals Tabs  Take 1 tablet by mouth daily.   Start taking on: January 15, 2022     pantoprazole 40 MG Tbec  Commonly known as: PROTONIX  Start taking on: January 15, 2022     thiamine 100 MG Tabs  Commonly known as: Vitamin B-1  Start taking on: Milly Null Examination of surgical area with podiatrist revealed left amputation of all toes, wound bed appears healthy and bleeding  Right foot fifth digit base healing with bleeding  Necrotic darkened skin noted as well on both feet    Assessment and Plan:      F

## 2022-01-14 NOTE — PROGRESS NOTES
[facility] Dosing Service    Follow-up Pharmacokinetic Consult for Vancomycin Dosing     Juliane Bain is a 39year old patient who is being treated for cellulitis. Patient is on day 2 of vancomycin and is currently receiving 1000 mg Q 12 hours.     Labs:

## 2022-01-14 NOTE — CONSULTS
Consult dictated. Patient evaluated earlier today. Last seizure was in October of last year. He is on Keppra 1000 mg p.o. twice daily at home, present dose in the hospital.  No neurological symptoms. Neurological examination reveals no focal deficits.

## 2022-01-14 NOTE — PROGRESS NOTES
[facility]    Report of Consultation    Martin Ambriz Patient Status:  Inpatient    1980 MRN [id number]   [facility] 5SW/SE Attending Laura Dukes, [address] Day # 1 PCP None Pcp     Date of Admission:  2022 PRN   Or  LORazepam (ATIVAN) injection 2 mg, 2 mg, Intravenous, Q1H PRN  thiamine (Vitamin B-1) tab 100 mg, 100 mg, Oral, Daily  multivitamin with minerals (Thera M Plus) tab 1 tablet, 1 tablet, Oral, Daily  folic acid (FOLVITE) tab 1 mg, 1 mg, Oral, Daily AST 37 01/14/2022    ALT 66 (H) 01/14/2022    PTT 35.9 (H) 01/13/2022    INR 1.20 01/13/2022    PTP 15.4 (H) 01/13/2022    ESRML 116 (H) 01/14/2022    CRP 32.00 (H) 01/13/2022    MG 1.9 01/13/2022    B12 507 01/13/2022    ETOH <3 01/13/2022         Imag COMPLETE (MIN 3 VIEWS), RIGHT (CPT=73630)    Result Date: 1/13/2022  CONCLUSION:  1. Since the previous study, there has been resection of the 1st and 5th toes. No definite evidence of osteomyelitis.   Chronic appearing irregularity of the navicular bone a (CST): Joseluis Walton MD on 1/13/2022 at 6:33 PM          XR CHEST AP PORTABLE  (CPT=71045)    Result Date: 1/13/2022  CONCLUSION:  1. Right infrahilar opacity.     Dictated by (CST): Obey Dai MD on 1/13/2022 at 11:36 AM     Finalized by

## 2022-01-14 NOTE — PLAN OF CARE
No acute changes during shift, CIWA scores stable. No complaints of pain or discomfort, pt resting comfortably in bed. Call light within reach, safety and seizure precautions in place. Will continue to monitor with frequent nursing rounds.      Problem: Maura Maradiaga goal  Description: INTERVENTIONS:  - Encourage pt to monitor pain and request assistance  - Assess pain using appropriate pain scale  - Administer analgesics based on type and severity of pain and evaluate response  - Implement non-pharmacological measures healing without S/S of infection  Description: INTERVENTIONS:  - Assess and document risk factors for pressure ulcer development  - Assess and document skin integrity  - Assess and document dressing/incision, wound bed, drain sites and surrounding tissue

## 2022-01-14 NOTE — CONSULTS
NEPHROLOGY CONSULT NOTE     DATE: 1/14/2022    Requesting Physician: Lashaun Bravo NP     Reason for Consult: REYNALDO     HISTORY OF PRESENT ILLNESS: Jordyn Henson is a 39year old male with history of  DM, seizure disorder, subdural hematoma, alcohol use.   Re Narrative      Not on file    Social Determinants of Health  Financial Resource Strain: Not on file  Food Insecurity: Not on file  Transportation Needs: Not on file  Physical Activity: Not on file  Stress: Not on file  Social Connections: Not on file  Inti EXAM:  /62   Pulse 78   Temp 97.5 °F (36.4 °C) (Oral)   Resp 16   Ht 5' 8\" (1.727 m)   Wt 121 lb 4.1 oz (55 kg)   SpO2 100%   BMI 18.44 kg/m²   GEN:  NAD  HEENT: NCAT    CHEST: CTA b/l  CARDIAC: S1S2 normal  ABD: soft, NT/ND  EXT:  no lower ext raji COMPLETE (MIN 3 VIEWS), LEFT (CPT=73630)    Result Date: 1/13/2022  CONCLUSION:   Prominent soft tissue emphysema plantar aspect of the forefoot which may relate to tissue necrosis given history of frostbite injury with superimposed gas forming soft tissue evidence to suggest osteomyelitis. 1.1 cm nonspecific lucency suggested in the navicular. Correlation with any previous imaging would be of benefit to assess for stability.   Finding may be further evaluated with nonemergent follow-up MRI of the foot with consult and allowing us to participate in care of [address]. We will continue to follow. Venessa Bazan.  Jamir Gomez and [facility] - Nephrology  1/14/2022

## 2022-01-14 NOTE — PROGRESS NOTES
[facility] Pharmacy Note:  Renal Dose Adjustment for enoxaparin (LOVENOX)    Tyler Rodriguez has been prescribed enoxaparin 40 mg subcutaneously every 24 hours. Estimated Creatinine Clearance: 22.6 mL/min (A) (based on SCr of 3.35 mg/dL (H)).     Calculated CrCl

## 2022-01-14 NOTE — PAYOR COMM NOTE
--------------  CONTINUED STAY REVIEW    Payor: Rajwinder [facility]  Subscriber #:  [id number]  Authorization Number: [id number]    Admit date: 1/13/22  Admit time:  3:51 AM    Admitting Physician: Radames Medina MD  Attending Physician:  Lois Abraham Action Dose Route User    1/14/2022 0603 New Bag 3.375 g Intravenous Jesus Huff RN    1/13/2022 2354 New Bag 3.375 g Intravenous Jesus Huff RN    1/13/2022 1624 New Bag 3.375 g Intravenous Babatunde Monteiro RN      potassium chloride 40 mEq in sodiu (38.2 °C) 100 18 99/59 100 % — None (Room air) — AD    01/13/22 1800 97.8 °F (36.6 °C) 104 16 101/56 99 % — None (Room air) — [facility]    01/13/22 1600 98 °F (36.7 °C) 105 16 100/64 98 % — None (Room air) — [facility]    01/13/22 1400 98 °F (36.7 °C) 105 16 109/77 100 % follow up with podiatry for Feb. 9. He states that last night he noticed his feet worsening and so he called the EMT himself and was asked to be taken to the ER. Pt lives in [place] and was brought to [address] [place] [address].   Pt denies any n/f/c/v/sob at this time but state 01/12/2022     HGB 9.2 (L) 01/12/2022     HCT 26.1 (L) 01/12/2022     .0 (H) 01/12/2022     CREATSERUM 0.69 (L) 01/12/2022     BUN 7 01/12/2022      (L) 01/12/2022     K 3.2 (L) 01/12/2022     CL 95 (L) 01/12/2022     CO2 30.0 01/12/2022     G on 1/13/2022 at 7:01 AM                  Impression:    Frostbite of both feet, initial encounter  Frostbite of feet, bilateral  Gangrene of toe of both feet ([facility] [address]     Recommendations:  Reviewed radiographs and examined patient  Due to gas in the forefoot gentleman with poorly controlled diabetes. Patient is a poor historian with underlying psych issues and EtOH abuse history. Patient is also intermittently homeless and was recently diagnosed with COVID 1/3/22.   Patient was admitted at [facility] for ~1 week aft of his infection     5. Disposition - inpatient. Continue current IV vancomycin and zosyn. Will f/u operative reports and findings. F/u all pending cultures. Trending temps and WBCs.   Wounds could be limb threatening if he does not heal.  Anticipate p

## 2022-01-14 NOTE — BH PROGRESS NOTE
Psych Liaison was consulted for CIWA despite CIWA score of 0-2 throughout admission. Patient is also COVID+. To conserve PPE, Psych Liaison provided MIN resources in Tyler's discharge instructions.  PHQ-4 = 0, CSSR = 0    Marianne DENIS LPC

## 2022-01-14 NOTE — CM/SW NOTE
[facility] Transfer Note:  Reason for transfer:   Out of network  Request initiated by:  Dr Christin Ayala issue:   Evolving sepsis with leukocytosis and complicated SSTI source with evolving gangrene t

## 2022-01-14 NOTE — PROGRESS NOTES
[address] [facility] Infectious Disease  Progress Note    Martin Katina Patient Status:  Inpatient    1980 MRN [id number]   Location [facility] 5SW/SE Attending Laura Dukes, [address] [facility] Day # 1 PCP None Pcp     Subjective: excluded.  Surgical consultation advised.  No definite radiographic evidence to suggest osteomyelitis.      CONCLUSION:       Subtle forefoot lucency soft tissues at the level of the MCP joints may reflect soft tissue emphysema.  This may reflect tissue ne follow. >35min spent at patient's bedside today in examination and coordination of today's plan of care.     Lani Lynch [facility] Infectious Disease  [phone]    1/14/2022  10:36 AM Size Of Lesion: 1.3 Detail Level: Detailed Incorporate Mauc In Note: Yes X Size Of Lesion In Cm (Optional): 0.9 Name Of The Referring Provider For Procedure: Sadia Aguila MD

## 2022-01-14 NOTE — PROGRESS NOTES
[facility] Hospitalist Team  Progress Note    Bunny Oliverkalyani Patient Status:  Inpatient    1980 MRN [id number]   Location [facility] 5SW/SE Attending Jaclyn Weaver, [address] [facility] Day # 1 PCP None Pcp     CC: Follow Up  PCP: None Pcp    SEE ATTENDING NOT versus inflammatory bronchiolitis.    -repeat CXR with right infrahilar opacity  -PCT 1 Elevated WBC see above  -prn  Inhalers  -abx-zosyn   -on RA  -follow up imaging recommended     ETOH Abuse/Use  Elevated LFT's  Hepatic Steatosis   Hepatomegaly  -see CT 104/72, pulse 78, temperature 97.5 °F (36.4 °C), temperature source Oral, resp. rate 16, height 5' 8\" (1.727 m), weight 121 lb 4.1 oz (55 kg), SpO2 100 %.     GENERAL: no apparent distress  NEURO: A/A Ox3  RESP: non labored, CTA  CARDIO: Regular, no murmur Or  LORazepam (ATIVAN) injection 1 mg, 1 mg, Intravenous, Q1H PRN   Or  LORazepam (ATIVAN) tab 2 mg, 2 mg, Oral, Q1H PRN   Or  LORazepam (ATIVAN) injection 2 mg, 2 mg, Intravenous, Q1H PRN  thiamine (Vitamin B-1) tab 100 mg, 100 mg, Oral, Daily  multivitam possibly reflecting age-indeterminate nondisplaced fracture versus artifact. A preliminary report was issued by the [address] [place] [address] Radiology teleradiology service. There are no major discrepancies.       Dictated by (CST): Chastity Jones MD on 1/13/2022 at 6:50 (CST): Virginie Corona MD on 1/13/2022 at 6:56 AM     Finalized by (CST): Virginie Corona MD on 1/13/2022 at 7:01 AM          [address] [address] ([id number])    Result Date: 1/13/2022  CONCLUSION:  1. Negative noncontrast CT brain.  2. No intracranial hemorrha

## 2022-01-15 LAB
ANION GAP SERPL CALC-SCNC: 9 MMOL/L (ref 0–18)
BASOPHILS # BLD: 0 X10(3) UL (ref 0–0.2)
BASOPHILS NFR BLD: 0 %
BUN BLD-MCNC: 22 MG/DL (ref 7–18)
BUN/CREAT SERPL: 3.9 (ref 10–20)
CALCIUM BLD-MCNC: 8 MG/DL (ref 8.5–10.1)
CHLORIDE SERPL-SCNC: 106 MMOL/L (ref 98–112)
CO2 SERPL-SCNC: 22 MMOL/L (ref 21–32)
CREAT BLD-MCNC: 5.66 MG/DL
CREAT UR-SCNC: 47 MG/DL
DEPRECATED RDW RBC AUTO: 47 FL (ref 35.1–46.3)
EOSINOPHIL # BLD: 0.19 X10(3) UL (ref 0–0.7)
EOSINOPHIL NFR BLD: 2 %
ERYTHROCYTE [DISTWIDTH] IN BLOOD BY AUTOMATED COUNT: 15 % (ref 11–15)
GLUCOSE BLD-MCNC: 76 MG/DL (ref 70–99)
HAPTOGLOB SERPL-MCNC: 397 MG/DL (ref 30–200)
HCT VFR BLD AUTO: 21.6 %
HGB BLD-MCNC: 7.1 G/DL
LDH SERPL L TO P-CCNC: 220 U/L
LEVETIRACETAM (KEPPRA): 6 UG/ML
LYMPHOCYTES NFR BLD: 1.07 X10(3) UL (ref 1–4)
LYMPHOCYTES NFR BLD: 11 %
MCH RBC QN AUTO: 28.2 PG (ref 26–34)
MCHC RBC AUTO-ENTMCNC: 32.9 G/DL (ref 31–37)
MCV RBC AUTO: 85.7 FL
METAMYELOCYTES # BLD: 0.1 X10(3) UL
METAMYELOCYTES NFR BLD: 1 %
MONOCYTES # BLD: 0.78 X10(3) UL (ref 0.1–1)
MONOCYTES NFR BLD: 8 %
MYELOCYTES # BLD: 0.29 X10(3) UL
MYELOCYTES NFR BLD: 3 %
NEUTROPHILS # BLD AUTO: 6.93 X10 (3) UL (ref 1.5–7.7)
NEUTROPHILS NFR BLD: 73 %
NEUTS BAND NFR BLD: 2 %
NEUTS HYPERSEG # BLD: 7.28 X10(3) UL (ref 1.5–7.7)
OSMOLALITY SERPL CALC.SUM OF ELEC: 286 MOSM/KG (ref 275–295)
PHOSPHATE SERPL-MCNC: 3.3 MG/DL (ref 2.5–4.9)
PLATELET # BLD AUTO: 520 10(3)UL (ref 150–450)
PLATELET MORPHOLOGY: NORMAL
POTASSIUM SERPL-SCNC: 4.3 MMOL/L (ref 3.5–5.1)
RBC # BLD AUTO: 2.52 X10(6)UL
SODIUM SERPL-SCNC: 137 MMOL/L (ref 136–145)
SODIUM SERPL-SCNC: 29 MMOL/L
TOTAL CELLS COUNTED BLD: 100
UUN UR-MCNC: 73 MG/DL
WBC # BLD AUTO: 9.7 X10(3) UL (ref 4–11)

## 2022-01-15 RX ORDER — SODIUM CHLORIDE, SODIUM LACTATE, POTASSIUM CHLORIDE, CALCIUM CHLORIDE 600; 310; 30; 20 MG/100ML; MG/100ML; MG/100ML; MG/100ML
INJECTION, SOLUTION INTRAVENOUS CONTINUOUS
Status: DISCONTINUED | OUTPATIENT
Start: 2022-01-15 | End: 2022-01-16

## 2022-01-15 NOTE — PROGRESS NOTES
[facility]    Nephrology Progress Note    Mohsen Manning Attending:  Brody Salazar DO       SUBJECTIVE:   Mohsen Manning is feeling OK today. He remains essentially anuric. He denies leg swelling. Denies dyspnea.  No hydronephrosis on renal u Judgment and thought content normal.          LABORATORY DATA:     Lab Results   Component Value Date    GLU 76 01/15/2022    BUN 22 (H) 01/15/2022    BUNCREA 3.9 (L) 01/15/2022    CREATSERUM 5.66 (H) 01/15/2022    ANIONGAP 9 01/15/2022    GFRNAA 11 (L) 01 echotexture.  No hydronephrosis, mass, calculus or perirenal fluid.     LEFT KIDNEY: Normal.  Left kidney length is 12.2 cm.  Normal echotexture.  No hydronephrosis, mass, calculus or perirenal fluid.     BLADDER: Normal.  No visible wall thickening, mass, appears euvolemic and he is on IVF with NS at 100 ml/hr.  - Unclear etiology. Considerations include: Vancomycin nephrotoxicity (called and confirmed with pharmacy that the Vancomycin level of 40 on 1/14 was a true trough). ATN with from sepsis.  Rishabh Longo

## 2022-01-15 NOTE — CM/SW NOTE
Julian Hargrove of [facility] at [phone] and spoke with Brent Crow RN in transfer center. No Beds Available.

## 2022-01-15 NOTE — PROGRESS NOTES
[facility] Hospitalist Progress Note     CC: Hospital Follow up    PCP: None Pcp       Assessment/Plan:     Principal Problem:    Frostbite of both feet, initial encounter  Active Problems:    Gangrene of toe of both feet ([facility] [address]    39 y.o. male wi 1/3/2022  CIWA has been 0 can stop  -Multivitamin, folic acid and thiamine    Sz Disorder   -Attributed to alcohol withdrawal per pt, he states he was started on it 1 year ago  -Keppra level pending  -Continue Keppra     SDH with previous [employer]  -CT h present  MSK: Distal bandages and wrapping in place bandages clean dry and intact on both feet  Skin: no rashes or lesions  Neuro: AO*3, motor intact, no sensory deficits        Data Review:       Labs:     Recent Labs   Lab 01/12/22  2304 01/12/22  2304 0 (CST):  Jayson Bryant MD on 1/13/2022 at 1:22 PM          XR FOOT, COMPLETE (MIN 3 VIEWS), LEFT (CPT=73630)    Result Date: 1/13/2022  CONCLUSION:   Prominent soft tissue emphysema plantar aspect of the forefoot which may relate to tissue necrosis given hi excluded. Surgical evaluation advised. No definite radiographic evidence to suggest osteomyelitis. 1.1 cm nonspecific lucency suggested in the navicular. Correlation with any previous imaging would be of benefit to assess for stability.   Finding may b

## 2022-01-15 NOTE — PROGRESS NOTES
[facility] Dosing Service  Antibiotic Dosing    Franky Leslie is a 39year old for whom pharmacy is dosing Meropenem for treatment of  diabetic foot. Allergies: is allergic to mushrooms.     Vitals: /75 (BP Location: Right arm)   Pulse 70   Temp

## 2022-01-15 NOTE — PHYSICAL THERAPY NOTE
PHYSICAL THERAPY EVALUATION - INPATIENT     Room Number: 548/548-A  Evaluation Date: 1/15/2022  Type of Evaluation: Initial   Physician Order: PT Eval and Treat    Presenting Problem: evolving sepsis; frostbite of bilateral feet; starvation ketoacidosis; from continued IP PT services to address these deficits in preparation for discharge.     DISCHARGE RECOMMENDATIONS  PT Discharge Recommendations: Home with home health PT;24 hour care/supervision    PLAN  PT Treatment Plan: Bed mobility;Transfer training;F limits      BALANCE  Static Sitting: Good  Dynamic Sitting: Good  Static Standing: Not tested  Dynamic Standing: Not tested        AM-PAC '6-Clicks' INPATIENT SHORT FORM - BASIC MOBILITY  How much difficulty does the patient currently have. ..   Patient Diff wheelchair w/ modified independence   Goal #4   Current Status    Goal #5    Goal #5   Current Status    Goal #6    Goal #6  Current Status

## 2022-01-15 NOTE — PLAN OF CARE
Problem: Patient Centered Care  Goal: Patient preferences are identified and integrated in the patient's plan of care  Description: Interventions:  - What would you like us to know as we care for you?  From home with mom  - Provide timely, complete, and a Absence of fever/infection during anticipated neutropenic period  Description: INTERVENTIONS  - Monitor WBC  - Administer growth factors as ordered  - Implement neutropenic guidelines  Outcome: Progressing     Problem: SAFETY ADULT - FALL  Goal: Free from resources and transportation as appropriate  - Identify discharge learning needs (meds, wound care, etc)  - Arrange for interpreters to assist at discharge as needed  - Consider post-discharge preferences of patient/family/discharge partner  - Complete SHOBHA

## 2022-01-15 NOTE — COVID NURSING ASSESSMENT
COVID-19 Daily Discharge Readiness-Nursing    O2 Sat at Rest:  SPO2% on Room Air at Rest: 100  %   O2 Sat with Exertion: SPO2% Ambulation on Oxygen: 100  % on    liters   Temperature max from last 24 hrs: Temp (24hrs), Av.8 °F (36.6 °C), Min:96.8 °F (3

## 2022-01-15 NOTE — OCCUPATIONAL THERAPY NOTE
OCCUPATIONAL THERAPY EVALUATION - INPATIENT      Room Number: 548/548-A  Evaluation Date: 1/15/2022  Type of Evaluation: Initial       Physician Order: IP Consult to Occupational Therapy  Reason for Therapy: ADL/IADL Dysfunction and Discharge Planning interacted with him. Will benefit from ongoing education and reinforcement. General Observations: The patient's Approx Degree of Impairment: 46.65% has been calculated based on documentation in the [facility] '6 clicks' Inpatient Daily Activity Short Form. BEARING RESTRICTION        R Lower Extremity: Non-Weight Bearing  L Lower Extremity: Non-Weight Bearing    PAIN ASSESSMENT  Ratin          ACTIVITY TOLERANCE  Pulse: 70        BP: 111/76  BP Location: Right leg  BP Method: Automatic  Patient Position:

## 2022-01-15 NOTE — PROGRESS NOTES
[facility]  [place] Infectious Disease Progress Note    Valri Loges Patient Status:  Inpatient    1980 MRN [id number]   Location [facility] 5SW/SE Attending Mick Torres, [address] [facility] Day # 2 PCP None Pcp     Subjective:  PE deferred to 100 %.    Temp (24hrs), Av.8 °F (36.6 °C), Min:96.8 °F (36 °C), Max:98.5 °F (36.9 °C)        Labs:  Lab Results   Component Value Date    WBC 9.7 01/15/2022    HGB 7.1 01/15/2022    HCT 21.6 01/15/2022    .0 01/15/2022    CREATSERUM 5.66 01/15/20

## 2022-01-15 NOTE — PLAN OF CARE
Patient A&Ox's 4. Patient has very little void (MD notified). Per order bladder scan PRN. Patient has safety precautions in place bed in the lowest position, bed alarm on, and call light within reach.  Continue to monitor patient with frequent nursing round techniques  - Monitor for opioid side effects  - Notify MD/LIP if interventions unsuccessful or patient reports new pain  - Anticipate increased pain with activity and pre-medicate as appropriate  Outcome: Progressing     Problem: RISK FOR INFECTION - ADUL DISCHARGE PLANNING  Goal: Discharge to home or other facility with appropriate resources  Description: INTERVENTIONS:  - Identify barriers to discharge w/pt and caregiver  - Include patient/family/discharge partner in discharge planning  - Arrange for need

## 2022-01-15 NOTE — COVID NURSING ASSESSMENT
COVID-19 Daily Discharge Readiness-Nursing    O2 Sat at Rest:   100  %   O2 Sat with Exertion:    % on 0    liters   Temperature max from last 24 hrs: Temp (24hrs), Av.5 °F (36.4 °C), Min:96.8 °F (36 °C), Max:98.5 °F (36.9 °C)    Inflammatory Markers:

## 2022-01-16 LAB
ANION GAP SERPL CALC-SCNC: 8 MMOL/L (ref 0–18)
BASOPHILS # BLD: 0 X10(3) UL (ref 0–0.2)
BASOPHILS NFR BLD: 0 %
BILIRUB UR QL: NEGATIVE
BUN BLD-MCNC: 26 MG/DL (ref 7–18)
BUN/CREAT SERPL: 3.5 (ref 10–20)
C DIFF TOX B STL QL: NEGATIVE
C3 SERPL-MCNC: 151 MG/DL (ref 90–180)
C4 SERPL-MCNC: 8.1 MG/DL (ref 10–40)
CALCIUM BLD-MCNC: 8.6 MG/DL (ref 8.5–10.1)
CHLORIDE SERPL-SCNC: 106 MMOL/L (ref 98–112)
CO2 SERPL-SCNC: 21 MMOL/L (ref 21–32)
COLOR UR: YELLOW
CREAT BLD-MCNC: 7.4 MG/DL
CREAT UR-SCNC: 38.5 MG/DL
CREAT UR-SCNC: 38.5 MG/DL
DEPRECATED RDW RBC AUTO: 45 FL (ref 35.1–46.3)
EOSINOPHIL # BLD: 0.1 X10(3) UL (ref 0–0.7)
EOSINOPHIL NFR BLD: 1 %
ERYTHROCYTE [DISTWIDTH] IN BLOOD BY AUTOMATED COUNT: 14.8 % (ref 11–15)
GLUCOSE BLD-MCNC: 73 MG/DL (ref 70–99)
GLUCOSE UR-MCNC: NEGATIVE MG/DL
HBV SURFACE AB SER QL: NONREACTIVE
HBV SURFACE AB SERPL IA-ACNC: <3.1 MIU/ML
HBV SURFACE AG SER-ACNC: <0.1 [IU]/L
HBV SURFACE AG SERPL QL IA: NONREACTIVE
HCT VFR BLD AUTO: 21.8 %
HCV AB SERPL QL IA: NONREACTIVE
HGB BLD-MCNC: 7.4 G/DL
HGB UR QL STRIP.AUTO: NEGATIVE
HYALINE CASTS #/AREA URNS AUTO: PRESENT /LPF
KETONES UR-MCNC: NEGATIVE MG/DL
LYMPHOCYTES NFR BLD: 1.03 X10(3) UL (ref 1–4)
LYMPHOCYTES NFR BLD: 10 %
MCH RBC QN AUTO: 28.5 PG (ref 26–34)
MCHC RBC AUTO-ENTMCNC: 33.9 G/DL (ref 31–37)
MCV RBC AUTO: 83.8 FL
METAMYELOCYTES # BLD: 0.1 X10(3) UL
METAMYELOCYTES NFR BLD: 1 %
MICROALBUMIN UR-MCNC: 38.1 MG/DL
MICROALBUMIN/CREAT 24H UR-RTO: 989.6 UG/MG (ref ?–30)
MONOCYTES # BLD: 1.03 X10(3) UL (ref 0.1–1)
MONOCYTES NFR BLD: 10 %
MORPHOLOGY: NORMAL
NEUTROPHILS # BLD AUTO: 7.39 X10 (3) UL (ref 1.5–7.7)
NEUTROPHILS NFR BLD: 75 %
NEUTS BAND NFR BLD: 3 %
NEUTS HYPERSEG # BLD: 8.03 X10(3) UL (ref 1.5–7.7)
NITRITE UR QL STRIP.AUTO: NEGATIVE
OSMOLALITY SERPL CALC.SUM OF ELEC: 283 MOSM/KG (ref 275–295)
PH UR: 6 [PH] (ref 5–8)
PLATELET # BLD AUTO: 652 10(3)UL (ref 150–450)
PLATELET MORPHOLOGY: NORMAL
POTASSIUM SERPL-SCNC: 4 MMOL/L (ref 3.5–5.1)
PROT UR-MCNC: 100 MG/DL
PROT UR-MCNC: 98.8 MG/DL
PROT/CREAT UR-RTO: 2.57
RBC # BLD AUTO: 2.6 X10(6)UL
SODIUM SERPL-SCNC: 135 MMOL/L (ref 136–145)
SP GR UR STRIP: 1.01 (ref 1–1.03)
TOTAL CELLS COUNTED BLD: 100
UROBILINOGEN UR STRIP-ACNC: <2
VANCOMYCIN SERPL-MCNC: 33.6 UG/ML (ref ?–40)
WBC # BLD AUTO: 10.3 X10(3) UL (ref 4–11)

## 2022-01-16 RX ORDER — SODIUM BICARBONATE 650 MG/1
650 TABLET ORAL 3 TIMES DAILY
Status: DISCONTINUED | OUTPATIENT
Start: 2022-01-16 | End: 2022-01-17

## 2022-01-16 NOTE — COVID NURSING ASSESSMENT
COVID-19 Daily Discharge Readiness-Nursing    O2 Sat at Rest:  SPO2% on Room Air at Rest: 100  %   Temperature max from last 24 hrs: Temp (24hrs), Av.5 °F (36.4 °C), Min:96.8 °F (36 °C), Max:97.8 °F (36.6 °C)    Inflammatory Markers: Recent Labs   Lab

## 2022-01-16 NOTE — PROGRESS NOTES
[address] [facility] Infectious Disease  Progress Note    Leila Aguirre Patient Status:  Inpatient    1980 MRN [id number]   Location [facility] 5SW/SE Attending Hoa Barnard, [address] [facility] Day # 3 PCP None Pcp     Subjective: forefoot lucency soft tissues at the level of the MCP joints may reflect soft tissue emphysema.  This may reflect tissue necrosis given history of frostbite injury with superimposed gas forming infection not excluded.  Surgical evaluation advised.  No defin Arlene Morales DO, [address] [facility] Infectious Disease  [phone]    1/16/2022  10:36 AM

## 2022-01-16 NOTE — PLAN OF CARE
Patient A&Ox's 4. Patient is up in chair -resting. Patient has very little void (MD notified). Per order- bladder scan PRN. Patient has safety precautions in place bed in the lowest position, bed alarm on, and call light within reach.  Continue to monitor p distraction and/or relaxation techniques  - Monitor for opioid side effects  - Notify MD/LIP if interventions unsuccessful or patient reports new pain  - Anticipate increased pain with activity and pre-medicate as appropriate  Outcome: Progressing     Prob Progressing     Problem: DISCHARGE PLANNING  Goal: Discharge to home or other facility with appropriate resources  Description: INTERVENTIONS:  - Identify barriers to discharge w/pt and caregiver  - Include patient/family/discharge partner in discharge kirstin

## 2022-01-16 NOTE — PLAN OF CARE
Problem: PAIN - ADULT  Goal: Verbalizes/displays adequate comfort level or patient's stated pain goal  Description: INTERVENTIONS:  - Encourage pt to monitor pain and request assistance  - Assess pain using appropriate pain scale  - Administer analgesics urinary retention  Description: INTERVENTIONS:  - Assess patient’s ability to void and empty bladder  - Monitor intake/output and perform bladder scan as needed  - Follow urinary retention protocol/standard of care  - Consider collaborating with pharmacy t

## 2022-01-16 NOTE — PROGRESS NOTES
[facility]    Nephrology Progress Note    Sujatha Thompson Attending:  Mk Velazquez DO       SUBJECTIVE:   Sujatha Thompson is feeling the same today. He is eating and drinking normally. He denies leg swelling. Denies dyspnea.  Continues to repo LABORATORY DATA:     Lab Results   Component Value Date    GLU 73 01/16/2022    BUN 26 (H) 01/16/2022    BUNCREA 3.5 (L) 01/16/2022    CREATSERUM 7.40 (H) 01/16/2022    ANIONGAP 8 01/16/2022    GFRNAA 8 (L) 01/16/2022    GFRAA 10 (L) 01/16/2022    CA 8 KIDNEY: Normal.  Right kidney length is 14.1 cm.  Normal echotexture.  No hydronephrosis, mass, calculus or perirenal fluid.     LEFT KIDNEY: Normal.  Left kidney length is 12.2 cm.  Normal echotexture.  No hydronephrosis, mass, calculus or perirenal fluid expansion.   - No obstructive uropathy.   - Considerations include: Vancomycin nephrotoxicity (called and confirmed with pharmacy that the Vancomycin level of 40 on 1/14 was a true trough). ATN with from sepsis.    - Subnephrotic proteinuria noted on UA: Ur

## 2022-01-16 NOTE — COVID NURSING ASSESSMENT
COVID-19 Daily Discharge Readiness-Nursing    O2 Sat at Rest:  SPO2% on Room Air at Rest: 100  %   O2 Sat with Exertion: SPO2% Ambulation on Oxygen: 100  % on    liters   Temperature max from last 24 hrs: Temp (24hrs), Av.6 °F (36.4 °C), Min:96.8 °F (3

## 2022-01-16 NOTE — PROGRESS NOTES
[facility] Hospitalist Progress Note     CC: Hospital Follow up    PCP: None Pcp       Assessment/Plan:     Principal Problem:    Frostbite of both feet, initial encounter  Active Problems:    Gangrene of toe of both feet ([facility] [address]    39 y.o. male wi imaging recommended     ETOH Abuse/Use  Elevated LFT's  Hepatic Steatosis   Hepatomegaly  -see CT A/P at [facility] on 1/3/2022  CIWA has been 0 can stop  -Multivitamin, folic acid and thiamine    Sz Disorder   -Attributed to alcohol withdrawal per pt, he stat peripheral edema  Abd: Abdomen soft, nontender, nondistended, bowel sounds present  MSK: Distal bandages and wrapping in place bandages clean dry and intact on both feet  Skin: no rashes or lesions        Data Review:       Labs:     Recent Labs   Lab 01/1 **OR** LORazepam, albuterol, guaiFENesin

## 2022-01-16 NOTE — CONSULTS
[facility] HEMATOLOGY ONCOLOGY  Report of Consultation    Tanya Sexton Patient Status:  Inpatient    1980 MRN [id number]   Location [facility] 5SW/SE Attending Hazeline Spurling, [address] [facility] Day # 3 PCP None Pcp     ADMIT DATE Continuous  •  Heparin Sodium (Porcine) 5000 UNIT/ML injection 5,000 Units, 5,000 Units, Subcutaneous, Q8H  •  acetaminophen (TYLENOL) tab 650 mg, 650 mg, Oral, Q8H  •  [Held by provider] vancomycin (VANCOCIN) 1,000 mg in sodium chloride 0.9% 250 mL IVPB-A 3:13 PM   Result Value Ref Range     87 - 241 U/L   Microalb/Creat Ratio, Random Urine    Collection Time: 01/15/22 11:26 PM   Result Value Ref Range    Microalbumin, Urine 38.10 mg/dL    Creatinine Ur Random 38.50 mg/dL    Malb/Cre Calc 989.6 (H) < Time: 01/16/22  6:45 AM   Result Value Ref Range    WBC 10.3 4.0 - 11.0 x10(3) uL    RBC 2.60 (L) 4.30 - 5.70 x10(6)uL    HGB 7.4 (L) 13.0 - 17.5 g/dL    HCT 21.8 (L) 39.0 - 53.0 %    MCV 83.8 80.0 - 100.0 fL    MCH 28.5 26.0 - 34.0 pg    MCHC 33.9 31.0 - 7.40*   GFRAA 136   < > 20* 13* 10*   GFRNAA 118   < > 17* 11* 8*   CA 9.1   < > 8.5 8.0* 8.6   ALB 2.0*  --  1.6*  --   --    *   < > 136 137 135*   K 3.2*   < > 4.0 4.3 4.0   CL 95*   < > 104 106 106   CO2 30.0   < > 25.0 22.0 21.0   ALKPHO 193*  - Gram Positive Rods N/A         Imaging:    XR FOOT, COMPLETE (MIN 3 VIEWS), LEFT (CPT=73630)    Result Date: 1/13/2022  CONCLUSION:  1. Since the previous study, there has been resection of the 1st through 5th toes.   No well-defined evidence of osteomyelit appearing irregularity of the navicular bone again noted.  2. Moderately extensive subcutaneous emphysema noted at the remaining soft tissues distal to the 1st and 5th metatarsals which may in part represent postsurgical change, but I cannot exclude progres MD on 1/13/2022 at 11:36 AM     Finalized by (CST): Ember Dai MD on 1/13/2022 at 11:37 AM              PROBLEM LIST:     Patient Active Problem List:     Frostbite of both feet, initial encounter     Gangrene of toe of both feet ([facility] [address]

## 2022-01-17 ENCOUNTER — APPOINTMENT (OUTPATIENT)
Dept: PICC SERVICES | Facility: HOSPITAL | Age: 42
DRG: 853 | End: 2022-01-17
Attending: INTERNAL MEDICINE
Payer: MEDICAID

## 2022-01-17 LAB
ALBUMIN SERPL ELPH-MCNC: 2.02 G/DL (ref 3.75–5.21)
ALBUMIN/GLOB SERPL: 0.62 {RATIO} (ref 1–2)
ALPHA1 GLOB SERPL ELPH-MCNC: 0.65 G/DL (ref 0.19–0.46)
ALPHA2 GLOB SERPL ELPH-MCNC: 0.98 G/DL (ref 0.48–1.05)
ANION GAP SERPL CALC-SCNC: 12 MMOL/L (ref 0–18)
ANION GAP SERPL CALC-SCNC: 13 MMOL/L (ref 0–18)
B-GLOBULIN SERPL ELPH-MCNC: 0.82 G/DL (ref 0.68–1.23)
BASOPHILS # BLD: 0 X10(3) UL (ref 0–0.2)
BASOPHILS NFR BLD: 0 %
BUN BLD-MCNC: 33 MG/DL (ref 7–18)
BUN BLD-MCNC: 36 MG/DL (ref 7–18)
BUN/CREAT SERPL: 3.7 (ref 10–20)
BUN/CREAT SERPL: 3.7 (ref 10–20)
CALCIUM BLD-MCNC: 8.5 MG/DL (ref 8.5–10.1)
CALCIUM BLD-MCNC: 9 MG/DL (ref 8.5–10.1)
CHLORIDE SERPL-SCNC: 103 MMOL/L (ref 98–112)
CHLORIDE SERPL-SCNC: 105 MMOL/L (ref 98–112)
CO2 SERPL-SCNC: 15 MMOL/L (ref 21–32)
CO2 SERPL-SCNC: 17 MMOL/L (ref 21–32)
CREAT BLD-MCNC: 8.87 MG/DL
CREAT BLD-MCNC: 9.63 MG/DL
DEPRECATED RDW RBC AUTO: 51.7 FL (ref 35.1–46.3)
EOSINOPHIL # BLD: 0.09 X10(3) UL (ref 0–0.7)
EOSINOPHIL NFR BLD: 1 %
ERYTHROCYTE [DISTWIDTH] IN BLOOD BY AUTOMATED COUNT: 15.7 % (ref 11–15)
GAMMA GLOB SERPL ELPH-MCNC: 0.83 G/DL (ref 0.62–1.7)
GLUCOSE BLD-MCNC: 111 MG/DL (ref 70–99)
GLUCOSE BLD-MCNC: 75 MG/DL (ref 70–99)
HCT VFR BLD AUTO: 26.5 %
HGB BLD-MCNC: 8.4 G/DL
IRON SATN MFR SERPL: 35 %
IRON SERPL-MCNC: 58 UG/DL
KAPPA LC FREE SER-MCNC: 11 MG/DL (ref 0.33–1.94)
KAPPA LC FREE/LAMBDA FREE SER NEPH: 1.02 {RATIO} (ref 0.26–1.65)
LAMBDA LC FREE SERPL-MCNC: 10.8 MG/DL (ref 0.57–2.63)
LYMPHOCYTES NFR BLD: 0.94 X10(3) UL (ref 1–4)
LYMPHOCYTES NFR BLD: 10 %
MCH RBC QN AUTO: 28.7 PG (ref 26–34)
MCHC RBC AUTO-ENTMCNC: 31.7 G/DL (ref 31–37)
MCV RBC AUTO: 90.4 FL
METAMYELOCYTES # BLD: 0.09 X10(3) UL
METAMYELOCYTES NFR BLD: 1 %
MONOCYTES # BLD: 0.19 X10(3) UL (ref 0.1–1)
MONOCYTES NFR BLD: 2 %
MORPHOLOGY: NORMAL
NEUTROPHILS # BLD AUTO: 6.39 X10 (3) UL (ref 1.5–7.7)
NEUTROPHILS NFR BLD: 83 %
NEUTS BAND NFR BLD: 3 %
NEUTS HYPERSEG # BLD: 8.08 X10(3) UL (ref 1.5–7.7)
NUCLEAR IGG TITR SER IF: NEGATIVE {TITER}
OSMOLALITY SERPL CALC.SUM OF ELEC: 282 MOSM/KG (ref 275–295)
OSMOLALITY SERPL CALC.SUM OF ELEC: 283 MOSM/KG (ref 275–295)
PLATELET # BLD AUTO: 576 10(3)UL (ref 150–450)
PLATELET MORPHOLOGY: NORMAL
POTASSIUM SERPL-SCNC: 4.1 MMOL/L (ref 3.5–5.1)
POTASSIUM SERPL-SCNC: 4.8 MMOL/L (ref 3.5–5.1)
PROT SERPL-MCNC: 5.3 G/DL (ref 6.4–8.2)
RBC # BLD AUTO: 2.93 X10(6)UL
SODIUM SERPL-SCNC: 132 MMOL/L (ref 136–145)
SODIUM SERPL-SCNC: 133 MMOL/L (ref 136–145)
TIBC SERPL-MCNC: 164 UG/DL (ref 240–450)
TOTAL CELLS COUNTED BLD: 100
TRANSFERRIN SERPL-MCNC: 110 MG/DL (ref 200–360)
WBC # BLD AUTO: 9.4 X10(3) UL (ref 4–11)

## 2022-01-17 RX ORDER — SODIUM BICARBONATE 650 MG/1
1300 TABLET ORAL 3 TIMES DAILY
Status: DISCONTINUED | OUTPATIENT
Start: 2022-01-17 | End: 2022-01-18

## 2022-01-17 NOTE — PLAN OF CARE
Problem: PAIN - ADULT  Goal: Verbalizes/displays adequate comfort level or patient's stated pain goal  Description: INTERVENTIONS:  - Encourage pt to monitor pain and request assistance  - Assess pain using appropriate pain scale  - Administer analgesics redness and/or skin breakdown  - Initiate interventions, skin care algorithm/standards of care as needed  Outcome: Progressing     Problem: SKIN/TISSUE INTEGRITY - ADULT  Goal: Incision(s), wounds(s) or drain site(s) healing without S/S of infection  Descr

## 2022-01-17 NOTE — PROGRESS NOTES
[address] and [facility] Infectious Disease  Progress Note    Juliane Bain Patient Status:  Inpatient    1980 MRN [id number]   Location [facility] 5SW/SE Attending Alison Perdomo MD   [facility] Day # 4 PCP None Pcp     Karyn Atkins joints may reflect soft tissue emphysema.  This may reflect tissue necrosis given history of frostbite injury with superimposed gas forming infection not excluded.  Surgical evaluation advised. No definite radiographic evidence to suggest osteomyelitis. today's plan of care.     Lani Lopez Ulises  [facility] Infectious Disease  [phone]    1/17/2022  12:04 PM

## 2022-01-17 NOTE — COVID NURSING ASSESSMENT
COVID-19 Daily Discharge Readiness-Nursing    O2 Sat at Rest:  SPO2% on Room Air at Rest: 100  %   O2 Sat with Exertion: SPO2% Ambulation on Oxygen: 100  % on    liters   Temperature max from last 24 hrs: Temp (24hrs), Av.4 °F (36.3 °C), Min:96.8 °F (3

## 2022-01-17 NOTE — PROGRESS NOTES
[facility]    Nephrology Progress Note      SUBJECTIVE:     Reports decreased UOP    No n/v, appetite is good   No SOB       PHYSICAL EXAM:     Vital Signs: /86 (BP Location: Right arm)   Pulse 78   Temp 98.4 °F (36.9 °C) (Oral)   Res 01/17/2022     Lab Results   Component Value Date    MALBP 38.10 01/15/2022    CREUR 38.50 01/15/2022    CREUR 38.50 01/15/2022     Lab Results   Component Value Date    COLORUR Yellow 01/15/2022    CLARITY Hazy (A) 01/15/2022    SPECGRAVITY 1.008 01/15/20 discharge  LORazepam (ATIVAN) tab 1 mg, 1 mg, Oral, Q1H PRN   Or  LORazepam (ATIVAN) injection 1 mg, 1 mg, Intravenous, Q1H PRN   Or  LORazepam (ATIVAN) tab 2 mg, 2 mg, Oral, Q1H PRN   Or  LORazepam (ATIVAN) injection 2 mg, 2 mg, Intravenous, Q1H PRN  thia be placed. - no emergent indication for dialysis as of now, however anticipate will need dialysis tomorrow. Repeat labs at [address] [place] [address]. [facility] with results.    - discussed renal biopsy with patient, he would like to think about this further and will let

## 2022-01-17 NOTE — PROGRESS NOTES
[facility] Hospitalist Progress Note     CC: Hospital Follow up    PCP: None Pcp       Assessment/Plan:     Principal Problem:    Frostbite of both feet, initial encounter  Active Problems:    Gangrene of toe of both feet ([facility] [address]    39 y.o. male wi respiratory symptoms  -prn  Inhalers  -abx-Meropenum   -on RA  -follow up imaging recommended     ETOH Abuse/Use  Elevated LFT's  Hepatic Steatosis   Hepatomegaly  -see CT A/P at [facility] on 1/3/2022  CIWA has been 0 can stop  -Multivitamin, folic acid and t kg)  01/13/22 0407 : 119 lb 11.2 oz (54.3 kg)      Exam   Gen: No acute distress, alert and oriented x3, no focal neurologic deficits  Heent: NC AT, neck supple  Pulm: Lungs clear, normal respiratory effort  CV: Heart with regular rate and rhythm, no perip

## 2022-01-17 NOTE — PHYSICAL THERAPY NOTE
PHYSICAL THERAPY TREATMENT NOTE - INPATIENT     Room Number: 548/548-A       Presenting Problem: evolving sepsis; frostbite of bilateral feet; starvation ketoacidosis; s/p amputation of digits 1-5 on R foot & 1 and 5 on L foot       Problem List  Principal with home health PT;24 hour care/supervision     PLAN  PT Treatment Plan: Bed mobility;Transfer training;Family education;Patient education; Energy conservation; Endurance; Body mechanics; Coordination;Balance training;Strengthening;Range of motion    SUBJECTI not stated   Goal #1 Patient is able to demonstrate supine - sit EOB @ level: independent      Goal #1   Current Status Mod I   Goal #2 Patient is able to demonstrate transfers EOB to/from Chair/Wheelchair at assistance level: modified independent with sli

## 2022-01-17 NOTE — CM/SW NOTE
Per Hattie Frost and Leonard Vincent in Lisa Aquino has approved [facility] hospitalization through 1/22. No need to continue to pursue xfer at this time. / to remain available for support and/or discharge planning.      Fish Davis RN

## 2022-01-17 NOTE — CM/SW NOTE
01/17/22 0800   CM/SW Referral Data   Referral Source    Reason for Referral Discharge planning   Informant Patient   Pertinent Medical Hx   Does patient have an established PCP?  No   Significant Past Medical/Mental Health Hx SDH, seizure di

## 2022-01-17 NOTE — PROGRESS NOTES
[facility] HEMATOLOGY ONCOLOGY  Report of Consultation    Mohsen Manning Patient Status:  Inpatient    1980 MRN [id number]   Location [facility] 5SW/SE Attending Brody Salazar, [address] [facility] Day # 4 PCP None Pcp     ADMIT DATE Daily  •  multivitamin with minerals (Thera M Plus) tab 1 tablet, 1 tablet, Oral, Daily  •  folic acid (FOLVITE) tab 1 mg, 1 mg, Oral, Daily  •  pantoprazole (PROTONIX) EC tab 40 mg, 40 mg, Oral, QAM AC  •  albuterol 108 (90 Base) MCG/ACT inhaler 2 puff, 2 9.0 8.5 - 10.1 mg/dL    Calculated Osmolality 282 275 - 295 mOsm/kg    GFR, Non- 7 (L) >=60    GFR, -American 8 (L) >=60   CBC W/ DIFFERENTIAL    Collection Time: 01/17/22  5:47 AM   Result Value Ref Range    WBC 9.4 4.0 - 11.0 x10(3 < > 17*   < > 11* 8* 7*   CA 9.1   < > 8.5   < > 8.0* 8.6 9.0   ALB 2.0*  --  1.6*  --   --   --   --    *   < > 136   < > 137 135* 133*   K 3.2*   < > 4.0   < > 4.3 4.0 4.1   CL 95*   < > 104   < > 106 106 105   CO2 30.0   < > 25.0   < > 22.0 21.0 1 10/16/2021 10/16/2021 06/12/2021 06/11/2021 06/10/2021 05/05/2021 05/04/2021 05/04/2021 05/04/2021 03/14/2021 03/13/2021 03/12/2021 02/06/2021 01/08/2021 01/07/2021 01/06/2021 01/05/2021 12/06/2020 11/15/2020 11/14/2020 11/13/2020 11/13/2020 10/23/2020 10/ 92.8 93.8 93.1 92.8 94.1 97.0 88.1 85.9 87.1   MCH 29.6 30.4 30.3 30.2 29.5 29.5 30.3 31.2 31.5 31.3 31.3 31.0 32.9 33.0 32.2 31.7 31.8 31.7 32.0 30.6 30.2 30.8 31.2 31.5 31.3 31.3 31.3 31.4 31.3 31.4 31.3 31.6 31.9 32.0 31.8 32.0 29.6 29.3 29.5   MCHC 33. Abnormal    Collection Time: 01/13/22  9:38 AM    Specimen: Foot, right;  Abscess   Result Value Ref Range    Aerobic Culture Result  N/A     Mixture of organisms suggestive of normal skin han    Aerobic Culture Result 2+ growth Staphylococcus aureus (A) Dictated by (CST): Grant Herrera MD on 1/13/2022 at 6:50 AM     Finalized by (CST): Grant Herrera MD on 1/13/2022 at 6:56 AM          XR FOOT, COMPLETE (MIN 3 VIEWS), RIGHT (CPT=73630)    Result Date: 1/13/2022  CONCLUSION:  1.  Since the previous s 1. Negative noncontrast CT brain. 2. No intracranial hemorrhage.     Dictated by (CST): Nina Galicia MD on 1/13/2022 at 6:29 PM     Finalized by (CST): Nina Galicia MD on 1/13/2022 at 6:33 PM          XR CHEST AP PORTABLE  (CPT=71045)    Resul

## 2022-01-17 NOTE — CM/SW NOTE
Care Coordination [facility] Transfer Note:  Reason for transfer:   Out of network  Request initiated by:  Dr Aretha Rodríguez issue:   Evolving sepsis with leukocytosis and complicated SSTI source with evolving gangrene

## 2022-01-17 NOTE — PROGRESS NOTES
[facility]    Report of Consultation    Heather Georgiana Patient Status:  Inpatient    1980 MRN [id number]   Location [facility] 5SW/SE Attending Carmen Solano MD   [address] Day # 4 PCP None Pcp     Date of Admission:  2022 tab 100 mg, 100 mg, Oral, Daily  multivitamin with minerals (Thera M Plus) tab 1 tablet, 1 tablet, Oral, Daily  folic acid (FOLVITE) tab 1 mg, 1 mg, Oral, Daily  pantoprazole (PROTONIX) EC tab 40 mg, 40 mg, Oral, QAM AC  albuterol 108 (90 Base) MCG/ACT inh 01/14/2022    AST 37 01/14/2022    ALT 66 (H) 01/14/2022    PTT 35.9 (H) 01/13/2022    INR 1.20 01/13/2022    PTP 15.4 (H) 01/13/2022    ESRML 116 (H) 01/14/2022    CRP 32.00 (H) 01/13/2022    MG 1.9 01/13/2022    PHOS 3.3 01/14/2022    B12 507 01/13/2022

## 2022-01-18 ENCOUNTER — APPOINTMENT (OUTPATIENT)
Dept: INTERVENTIONAL RADIOLOGY/VASCULAR | Facility: HOSPITAL | Age: 42
DRG: 853 | End: 2022-01-18
Attending: INTERNAL MEDICINE
Payer: MEDICAID

## 2022-01-18 LAB
ALBUMIN SERPL-MCNC: 1.4 G/DL (ref 3.4–5)
ANION GAP SERPL CALC-SCNC: 11 MMOL/L (ref 0–18)
ANION GAP SERPL CALC-SCNC: 11 MMOL/L (ref 0–18)
BASOPHILS # BLD: 0.09 X10(3) UL (ref 0–0.2)
BASOPHILS NFR BLD: 1 %
BUN BLD-MCNC: 40 MG/DL (ref 7–18)
BUN BLD-MCNC: 40 MG/DL (ref 7–18)
BUN/CREAT SERPL: 3.7 (ref 10–20)
BUN/CREAT SERPL: 3.7 (ref 10–20)
CALCIUM BLD-MCNC: 8.6 MG/DL (ref 8.5–10.1)
CALCIUM BLD-MCNC: 8.6 MG/DL (ref 8.5–10.1)
CHLORIDE SERPL-SCNC: 102 MMOL/L (ref 98–112)
CHLORIDE SERPL-SCNC: 102 MMOL/L (ref 98–112)
CO2 SERPL-SCNC: 20 MMOL/L (ref 21–32)
CO2 SERPL-SCNC: 20 MMOL/L (ref 21–32)
CREAT BLD-MCNC: 10.7 MG/DL
CREAT BLD-MCNC: 10.7 MG/DL
DEPRECATED RDW RBC AUTO: 43.3 FL (ref 35.1–46.3)
EOSINOPHIL # BLD: 0.19 X10(3) UL (ref 0–0.7)
EOSINOPHIL NFR BLD: 2 %
ERYTHROCYTE [DISTWIDTH] IN BLOOD BY AUTOMATED COUNT: 14.6 % (ref 11–15)
FOLATE SERPL-MCNC: 16.7 NG/ML (ref 8.7–?)
GBM AB, IGG (MAFD): 0 AU/ML
GLUCOSE BLD-MCNC: 76 MG/DL (ref 70–99)
GLUCOSE BLD-MCNC: 76 MG/DL (ref 70–99)
HBV CORE AB SERPL QL IA: NONREACTIVE
HBV SURFACE AB SER QL: NONREACTIVE
HBV SURFACE AB SERPL IA-ACNC: <3.1 MIU/ML
HBV SURFACE AG SER-ACNC: <0.1 [IU]/L
HBV SURFACE AG SERPL QL IA: NONREACTIVE
HCT VFR BLD AUTO: 24.5 %
HGB BLD-MCNC: 8.5 G/DL
LYMPHOCYTES NFR BLD: 0.37 X10(3) UL (ref 1–4)
LYMPHOCYTES NFR BLD: 4 %
MCH RBC QN AUTO: 28.2 PG (ref 26–34)
MCHC RBC AUTO-ENTMCNC: 34.7 G/DL (ref 31–37)
MCV RBC AUTO: 81.4 FL
MONOCYTES # BLD: 0.74 X10(3) UL (ref 0.1–1)
MONOCYTES NFR BLD: 8 %
MORPHOLOGY: NORMAL
NEUTROPHILS # BLD AUTO: 6.75 X10 (3) UL (ref 1.5–7.7)
NEUTROPHILS NFR BLD: 82 %
NEUTS BAND NFR BLD: 3 %
NEUTS HYPERSEG # BLD: 7.91 X10(3) UL (ref 1.5–7.7)
OSMOLALITY SERPL CALC.SUM OF ELEC: 285 MOSM/KG (ref 275–295)
OSMOLALITY SERPL CALC.SUM OF ELEC: 285 MOSM/KG (ref 275–295)
PHOSPHATE SERPL-MCNC: 5.6 MG/DL (ref 2.5–4.9)
PLATELET # BLD AUTO: 607 10(3)UL (ref 150–450)
PLATELET MORPHOLOGY: NORMAL
POTASSIUM SERPL-SCNC: 4.7 MMOL/L (ref 3.5–5.1)
POTASSIUM SERPL-SCNC: 4.7 MMOL/L (ref 3.5–5.1)
RBC # BLD AUTO: 3.01 X10(6)UL
SODIUM SERPL-SCNC: 133 MMOL/L (ref 136–145)
SODIUM SERPL-SCNC: 133 MMOL/L (ref 136–145)
TOTAL CELLS COUNTED BLD: 100
VIT B12 SERPL-MCNC: 438 PG/ML (ref 193–986)
WBC # BLD AUTO: 9.3 X10(3) UL (ref 4–11)

## 2022-01-18 PROCEDURE — 5A1D70Z PERFORMANCE OF URINARY FILTRATION, INTERMITTENT, LESS THAN 6 HOURS PER DAY: ICD-10-PCS | Performed by: INTERNAL MEDICINE

## 2022-01-18 PROCEDURE — 02HV33Z INSERTION OF INFUSION DEVICE INTO SUPERIOR VENA CAVA, PERCUTANEOUS APPROACH: ICD-10-PCS | Performed by: RADIOLOGY

## 2022-01-18 PROCEDURE — 0JH63XZ INSERTION OF TUNNELED VASCULAR ACCESS DEVICE INTO CHEST SUBCUTANEOUS TISSUE AND FASCIA, PERCUTANEOUS APPROACH: ICD-10-PCS | Performed by: RADIOLOGY

## 2022-01-18 RX ORDER — LIDOCAINE HYDROCHLORIDE 20 MG/ML
INJECTION, SOLUTION EPIDURAL; INFILTRATION; INTRACAUDAL; PERINEURAL
Status: COMPLETED
Start: 2022-01-18 | End: 2022-01-18

## 2022-01-18 RX ORDER — HEPARIN SODIUM 1000 [USP'U]/ML
INJECTION, SOLUTION INTRAVENOUS; SUBCUTANEOUS
Status: COMPLETED
Start: 2022-01-18 | End: 2022-01-18

## 2022-01-18 RX ORDER — ALBUMIN (HUMAN) 12.5 G/50ML
100 SOLUTION INTRAVENOUS AS NEEDED
Status: DISCONTINUED | OUTPATIENT
Start: 2022-01-18 | End: 2022-01-27

## 2022-01-18 RX ORDER — HEPARIN SODIUM 1000 [USP'U]/ML
1.5 INJECTION, SOLUTION INTRAVENOUS; SUBCUTANEOUS ONCE
Status: DISCONTINUED | OUTPATIENT
Start: 2022-01-18 | End: 2022-01-18

## 2022-01-18 RX ORDER — HEPARIN SODIUM 1000 [USP'U]/ML
1.6 INJECTION, SOLUTION INTRAVENOUS; SUBCUTANEOUS ONCE
Status: COMPLETED | OUTPATIENT
Start: 2022-01-18 | End: 2022-01-19

## 2022-01-18 RX ORDER — CEFAZOLIN SODIUM/WATER 2 G/20 ML
SYRINGE (ML) INTRAVENOUS
Status: DISCONTINUED
Start: 2022-01-18 | End: 2022-01-18 | Stop reason: WASHOUT

## 2022-01-18 RX ORDER — MIDAZOLAM HYDROCHLORIDE 1 MG/ML
INJECTION INTRAMUSCULAR; INTRAVENOUS
Status: COMPLETED
Start: 2022-01-18 | End: 2022-01-18

## 2022-01-18 NOTE — OCCUPATIONAL THERAPY NOTE
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

## 2022-01-18 NOTE — PROGRESS NOTES
[facility]    Nephrology Progress Note      SUBJECTIVE:     UOP remains decreased   Renal fxn continues to worsen        PHYSICAL EXAM:     Vital Signs: /73 (BP Location: Right arm)   Pulse 79   Temp 98.1 °F (36.7 °C) (Oral)   Resp 16 01/18/2022    MCHC 34.7 01/18/2022    RDW 14.6 01/18/2022    NEPRELIM 6.75 01/18/2022    NEUTABS 7.91 (H) 01/18/2022    LYMPHABS 0.37 (L) 01/18/2022    EOSABS 0.19 01/18/2022    BASABS 0.09 01/18/2022    NEUT 82 01/18/2022    LYMPH 4 01/18/2022    MON 8 01 5,000 Units, Subcutaneous, Q8H  acetaminophen (TYLENOL) tab 650 mg, 650 mg, Oral, Q8H  levETIRAcetam (KEPPRA) tab 1,000 mg, 1,000 mg, Oral, BID  influenza vaccine split quad (FLULAVAL) ages 6 months to 64 years inj 0.5ml, 0.5 mL, Intramuscular, Prior to [facility] LDH and haptoglobin unremarkable. - Absence of thrombocytopenia would speak against a TMA. - blood transfusions per primary      #. COVID-19 on 1/3/2022  - per primary / ID      Rasheed MOORE and Dr. Celia Mitchell      We will continue to follow.       Mart Denver

## 2022-01-18 NOTE — VASCULAR ACCESS
Vascular Access Consult Note  1/18/2022     Reviewed H&P and current condition.   Past Medical History:  No date: Alcohol abuse  No date: Asthma  No date: Diabetes ([facility]  No date: SDH (subdural hematoma) (HCC)  No date: Seizure disorder ([facility]       Reviewed Location:  Gauge:   Qty:    Vascular access consult completed. Recommendation:  Here for PICC insertion. Patient wants to speak with his doctor and does not want the PICC. Will remove from list. Please reorder if he changes his mind.

## 2022-01-18 NOTE — PROGRESS NOTES
[address] [facility] Infectious Disease  Progress Note    Becky Burns Patient Status:  Inpatient    1980 MRN [id number]   Location [facility] 5SW/SE Attending Cash Nicholson MD   Hosp Day # 5 PCP None Pcp     Destiny Stanton relate to tissue necrosis given history of frostbite injury with superimposed gas forming soft tissue infection not excluded.  Surgical consultation advised.  No definite radiographic evidence to suggest osteomyelitis.      CONCLUSION:       Subtle forefoo continued OM at bony margins and ongoing necrosis.  May need further OR.  Worsening renal failure and need for HD, ok for tunneled line. Patient will categorically require ECF for complex wound care, IV antibiotics, and HD after discharge.   >35min spent a

## 2022-01-18 NOTE — PROGRESS NOTES
01/18/22 0835   Wound 01/13/22 Other (comment) Foot Left   Date First Assessed: 01/13/22   Present on Hospital Admission: Yes  Primary Wound Type:  Other (comment)  Location: Foot  Wound Location Orientation: Left  Wound Description (Comments): frostbite BRAIN SURGERY  11/2021    malinda holes      Social History    Tobacco Use      Smoking status: Current Every Day Smoker        Packs/day: 0.50        Types: Cigarettes      Smokeless tobacco: Never Used    Vaping Use      Vaping Use: Not on file    Alcohol u TP 5.3 (L) 01/16/2022    AST 37 01/14/2022    ALT 66 (H) 01/14/2022    MG 1.9 01/13/2022    PHOS 5.6 (H) 01/18/2022     No results found for: PREALBUMIN      Time Spent 30 Minutes.     Nery Wise, RN, BSN, [address] [facility]

## 2022-01-18 NOTE — BRIEF PROCEDURE NOTE
[facility]  Procedure Note    Elizabeth Reed Patient Status:  Inpatient    1980 MRN [id number]   Location [facility] 5SW/SE Attending Kassy Shipman MD   Hosp Day # 5 PCP None Pcp     Procedure:  Tunneled permacath    Pr

## 2022-01-18 NOTE — PLAN OF CARE
No acute changes during shift. No complaints of pain or discomfort, pt resting comfortably in bed. Call light within reach, safety precautions in place. Will continue to monitor.     Problem: Patient Centered Care  Goal: Patient preferences are identified a unsuccessful or patient reports new pain  - Anticipate increased pain with activity and pre-medicate as appropriate  Outcome: Progressing     Problem: RISK FOR INFECTION - ADULT  Goal: Absence of fever/infection during anticipated neutropenic period  Descr INTEGRITY - ADULT  Goal: Skin integrity remains intact  Description: INTERVENTIONS  - Assess and document risk factors for pressure ulcer development  - Assess and document skin integrity  - Monitor for areas of redness and/or skin breakdown  - Initiate in

## 2022-01-18 NOTE — COVID NURSING ASSESSMENT
COVID-19 Daily Discharge Readiness-Nursing    O2 Sat at Rest:  SPO2% on Room Air at Rest: 100  %   O2 Sat with Exertion: SPO2% Ambulation on Oxygen: 100  % on    liters   Temperature max from last 24 hrs: Temp (24hrs), Av.6 °F (36.4 °C), Min:97.1 °F (3

## 2022-01-19 ENCOUNTER — APPOINTMENT (OUTPATIENT)
Dept: PICC SERVICES | Facility: HOSPITAL | Age: 42
DRG: 853 | End: 2022-01-19
Attending: HOSPITALIST
Payer: MEDICAID

## 2022-01-19 ENCOUNTER — APPOINTMENT (OUTPATIENT)
Dept: INTERVENTIONAL RADIOLOGY/VASCULAR | Facility: HOSPITAL | Age: 42
DRG: 853 | End: 2022-01-19
Attending: INTERNAL MEDICINE
Payer: MEDICAID

## 2022-01-19 LAB
ANION GAP SERPL CALC-SCNC: 9 MMOL/L (ref 0–18)
BASOPHILS # BLD AUTO: 0.02 X10(3) UL (ref 0–0.2)
BASOPHILS NFR BLD AUTO: 0.3 %
BUN BLD-MCNC: 27 MG/DL (ref 7–18)
BUN/CREAT SERPL: 3.4 (ref 10–20)
CALCIUM BLD-MCNC: 8.3 MG/DL (ref 8.5–10.1)
CHLORIDE SERPL-SCNC: 104 MMOL/L (ref 98–112)
CO2 SERPL-SCNC: 24 MMOL/L (ref 21–32)
CREAT BLD-MCNC: 7.99 MG/DL
DEPRECATED RDW RBC AUTO: 42.2 FL (ref 35.1–46.3)
EOSINOPHIL # BLD AUTO: 0.07 X10(3) UL (ref 0–0.7)
EOSINOPHIL NFR BLD AUTO: 0.9 %
ERYTHROCYTE [DISTWIDTH] IN BLOOD BY AUTOMATED COUNT: 14.5 % (ref 11–15)
GLUCOSE BLD-MCNC: 75 MG/DL (ref 70–99)
HCT VFR BLD AUTO: 21.3 %
HCT VFR BLD AUTO: 22.3 %
HGB BLD-MCNC: 7.2 G/DL
HGB BLD-MCNC: 7.8 G/DL
IMM GRANULOCYTES # BLD AUTO: 0.28 X10(3) UL (ref 0–1)
IMM GRANULOCYTES NFR BLD: 3.7 %
INR BLD: 1 (ref 0.8–1.2)
LYMPHOCYTES # BLD AUTO: 1.05 X10(3) UL (ref 1–4)
LYMPHOCYTES NFR BLD AUTO: 13.9 %
MCH RBC QN AUTO: 28.1 PG (ref 26–34)
MCHC RBC AUTO-ENTMCNC: 35 G/DL (ref 31–37)
MCV RBC AUTO: 80.2 FL
MONOCYTES # BLD AUTO: 0.69 X10(3) UL (ref 0.1–1)
MONOCYTES NFR BLD AUTO: 9.1 %
MYELOPEROX ANTIBODIES, IGG: 0 AU/ML
NEUTROPHILS # BLD AUTO: 5.44 X10 (3) UL (ref 1.5–7.7)
NEUTROPHILS # BLD AUTO: 5.44 X10(3) UL (ref 1.5–7.7)
NEUTROPHILS NFR BLD AUTO: 72.1 %
OSMOLALITY SERPL CALC.SUM OF ELEC: 288 MOSM/KG (ref 275–295)
PLATELET # BLD AUTO: 587 10(3)UL (ref 150–450)
POTASSIUM SERPL-SCNC: 4.6 MMOL/L (ref 3.5–5.1)
PROTHROMBIN TIME: 13.3 SECONDS (ref 11.6–14.8)
RBC # BLD AUTO: 2.78 X10(6)UL
SERINE PROTEASE3, IGG: 0 AU/ML
SODIUM SERPL-SCNC: 137 MMOL/L (ref 136–145)
WBC # BLD AUTO: 7.6 X10(3) UL (ref 4–11)

## 2022-01-19 PROCEDURE — 02HV33Z INSERTION OF INFUSION DEVICE INTO SUPERIOR VENA CAVA, PERCUTANEOUS APPROACH: ICD-10-PCS | Performed by: INTERNAL MEDICINE

## 2022-01-19 PROCEDURE — 0TB13ZX EXCISION OF LEFT KIDNEY, PERCUTANEOUS APPROACH, DIAGNOSTIC: ICD-10-PCS | Performed by: RADIOLOGY

## 2022-01-19 RX ORDER — LIDOCAINE HYDROCHLORIDE 20 MG/ML
INJECTION, SOLUTION EPIDURAL; INFILTRATION; INTRACAUDAL; PERINEURAL
Status: COMPLETED
Start: 2022-01-19 | End: 2022-01-19

## 2022-01-19 RX ORDER — MIDAZOLAM HYDROCHLORIDE 1 MG/ML
INJECTION INTRAMUSCULAR; INTRAVENOUS
Status: COMPLETED
Start: 2022-01-19 | End: 2022-01-19

## 2022-01-19 RX ORDER — VANCOMYCIN HYDROCHLORIDE 125 MG/1
125 CAPSULE ORAL DAILY
Status: DISCONTINUED | OUTPATIENT
Start: 2022-01-19 | End: 2022-01-27

## 2022-01-19 NOTE — IVS NOTE
Procedure hand off report given to TERESA Young. Procedure site remains dry and intact with no signs and symptoms of bleeding and hematoma. Bedrest maintained.   Relayed to RN patient need H & H at 1330

## 2022-01-19 NOTE — PROCEDURES
[facility]  Procedure Note    Amanda Clarke Patient Status:  Inpatient    1980 MRN [id number]   Location [facility] 5SW/SE Attending Adrienne Marino MD   Hosp Day # 6 PCP None Pcp     Procedure: Ultrasound guided left micheline

## 2022-01-19 NOTE — PLAN OF CARE
No acute changes during shift, pt had dialysis last night with 0mL of output. Renal biopsy scheduled for today, consent signed and in chart, pre-op checklist complete. No complaints of pain or discomfort, pt resting comfortably in bed.  Call light within re anxiety  - Utilize distraction and/or relaxation techniques  - Monitor for opioid side effects  - Notify MD/LIP if interventions unsuccessful or patient reports new pain  - Anticipate increased pain with activity and pre-medicate as appropriate  Outcome: P related to functional status, cognitive ability or social support system  Outcome: Progressing     Problem: SKIN/TISSUE INTEGRITY - ADULT  Goal: Skin integrity remains intact  Description: INTERVENTIONS  - Assess and document risk factors for pressure ulce as appropriate  Outcome: Progressing

## 2022-01-19 NOTE — PROGRESS NOTES
[facility] Hospitalist Progress Note     CC: Hospital Follow up    PCP: None Pcp       Assessment/Plan:     Principal Problem:    Frostbite of both feet, initial encounter  Active Problems:    Gangrene of toe of both feet [facility])    Mr. Maria Del Carmen Durand is a right foot toes may be required but at this time does not appear to need further surgery  Podiatry signed off and recommended continued wound care, seen by wound Rn, will reconsult if needed  Has podiatry follow up at [address] is declining PICC currently b °C)-98.6 °F (37 °C)] 98.6 °F (37 °C)  Pulse:  [71-79] 76  Resp:  [16-18] 16  BP: (107-118)/(67-85) 118/85      Intake/Output:    Intake/Output Summary (Last 24 hours) at 1/18/2022 1840  Last data filed at 1/18/2022 1317  Gross per 24 hour   Intake 100 ml [Held by provider] Heparin Sodium (Porcine)  5,000 Units Subcutaneous Q8H   • acetaminophen  650 mg Oral Q8H   • levETIRAcetam  1,000 mg Oral BID   • thiamine  100 mg Oral Daily   • multivitamin with minerals  1 tablet Oral Daily   • folic acid  1 mg Oral

## 2022-01-19 NOTE — PROGRESS NOTES
Vascular Access Note    Vascular Access Screening:   Allergies to Lidocaine: no  Allergies to Latex: no  Presence of Pacemaker/Defibrillator: No  Mastectomy with Lymph Node Dissection: No  AV Fistula / AV Graft: No  Dialysis Catheter: Right Side  Central Georgi Ort

## 2022-01-19 NOTE — CM/SW NOTE
Diane Reyna asked for assistance updating dc plan. Pt had HD tunneled catheter placed and first HD session on 1/18. PICC line place 1/19. Renal bx done 1/19. CM spoke w/ pt who confirmed he lives with his mom in [place] at [address] [facility].  PRIYANKA noemi

## 2022-01-19 NOTE — PROGRESS NOTES
[facility]    Nephrology Progress Note      SUBJECTIVE:     Tolerated first session of dialysis yesterday   Feels about the same   Reports having diarrhea        PHYSICAL EXAM:     Vital Signs: /83 (BP Location: Left arm)   Pulse 90 01/18/2022    EOS 2 01/18/2022    BASO 1 01/18/2022    NEPERCENT 72.1 01/19/2022    LYPERCENT 13.9 01/19/2022    MOPERCENT 9.1 01/19/2022    EOPERCENT 0.9 01/19/2022    BAPERCENT 0.3 01/19/2022    NE 5.44 01/19/2022    LYMABS 1.05 01/19/2022    MOABSO 0.69 sodium chloride 0.9% 100 mL IVPB-MBP, 500 mg, Intravenous, Q24H  [Held by provider] Heparin Sodium (Porcine) 5000 UNIT/ML injection 5,000 Units, 5,000 Units, Subcutaneous, Q8H  acetaminophen (TYLENOL) tab 650 mg, 650 mg, Oral, Q8H  levETIRAcetam (KEPPRA) t follow renal fxn and I/Os, monitor for signs of renal recovery. # Acidosis   - worsening due to REYNALDO   - improved with RRT, stopped sodium bicarb. #. Anemia  - Schistocytes on smear.   - LDH and haptoglobin unremarkable.   - Absence of thrombocytope

## 2022-01-19 NOTE — PROGRESS NOTES
[facility] Infectious Disease Progress Note    Zenaida Espinosa Patient Status:  Inpatient    1980 MRN [id number]   Location [facility] 5SW/SE Attending Britni Tovar, [address] [facility] Day # 6 PCP None Pcp     Subjective:  PE deferred to (68.4 kg), SpO2 99 %.    Temp (24hrs), Av.2 °F (36.8 °C), Min:97.9 °F (36.6 °C), Max:98.6 °F (37 °C)        Labs:  Lab Results   Component Value Date    WBC 7.6 2022    HGB 7.8 2022    HCT 22.3 2022    .0 2022    AKILAH

## 2022-01-19 NOTE — PROGRESS NOTES
[facility] Hospitalist Progress Note     CC: Hospital Follow up    PCP: None Pcp       Assessment/Plan:     Principal Problem:    Frostbite of both feet, initial encounter  Active Problems:    Gangrene of toe of both feet [facility])    Mr. Sj Ang is a evaluated, good blood flow, no intervention needed  podiatry following additional amputation of the right foot toes may be required but at this time does not appear to need further surgery  Podiatry signed off and recommended continued wound care, seen by (68.4 kg), SpO2 98 %.     Temp:  [97.9 °F (36.6 °C)-98.4 °F (36.9 °C)] 97.9 °F (36.6 °C)  Pulse:  [65-90] 66  Resp:  [13-18] 15  BP: (108-139)/(77-94) 117/94      Intake/Output:    Intake/Output Summary (Last 24 hours) at 1/19/2022 8600  Last data filed at AST 79* 37  --   --   --    ALB 2.0* 1.6*  --  2.02* 1.4*   LDH  --   --  220  --   --          Imaging:  IR TUNNELED CV CATH INSERTION EXCHANGE CHECK    Result Date: 1/19/2022  CONCLUSION: Tunneled permacath placement for hemodialysis.       Dictated by

## 2022-01-20 LAB
ANION GAP SERPL CALC-SCNC: 8 MMOL/L (ref 0–18)
BUN BLD-MCNC: 32 MG/DL (ref 7–18)
BUN/CREAT SERPL: 3.5 (ref 10–20)
CALCIUM BLD-MCNC: 8.6 MG/DL (ref 8.5–10.1)
CHLORIDE SERPL-SCNC: 103 MMOL/L (ref 98–112)
CO2 SERPL-SCNC: 24 MMOL/L (ref 21–32)
CREAT BLD-MCNC: 9.26 MG/DL
GLUCOSE BLD-MCNC: 85 MG/DL (ref 70–99)
HGB BLD-MCNC: 7.6 G/DL
OSMOLALITY SERPL CALC.SUM OF ELEC: 286 MOSM/KG (ref 275–295)
PHOSPHATE SERPL-MCNC: 5.6 MG/DL (ref 2.5–4.9)
POTASSIUM SERPL-SCNC: 4.4 MMOL/L (ref 3.5–5.1)
SODIUM SERPL-SCNC: 135 MMOL/L (ref 136–145)

## 2022-01-20 RX ORDER — HEPARIN SODIUM 1000 [USP'U]/ML
INJECTION, SOLUTION INTRAVENOUS; SUBCUTANEOUS
Status: COMPLETED
Start: 2022-01-20 | End: 2022-01-20

## 2022-01-20 NOTE — PROGRESS NOTES
[facility]    Nephrology Progress Note      SUBJECTIVE:     Doing well post renal biopsy.     No pain or hematuria  Due to dialysis staffing, unable to complete dialysis yesterday      PHYSICAL EXAM:     Vital Signs: /83 (BP Location: 4 01/18/2022    MON 8 01/18/2022    EOS 2 01/18/2022    BASO 1 01/18/2022    NEPERCENT 72.1 01/19/2022    LYPERCENT 13.9 01/19/2022    MOPERCENT 9.1 01/19/2022    EOPERCENT 0.9 01/19/2022    BAPERCENT 0.3 01/19/2022    NE 5.44 01/19/2022    LYMABS 1.05 01/ 25 % solution 100 mL, 100 mL, Intravenous, PRN  Meropenem (MERREM) 500 mg in sodium chloride 0.9% 100 mL IVPB-MBP, 500 mg, Intravenous, Q24H  [Held by provider] Heparin Sodium (Porcine) 5000 UNIT/ML injection 5,000 Units, 5,000 Units, Subcutaneous, Q8H  ac CLARISA tunneled HD cath on 1/18. 2nd session of dialysis planned for today.  Will need third session tomorrow    - follow renal fxn and I/Os, monitor for signs of renal recovery.   - [facility]      # Aci

## 2022-01-20 NOTE — CM/SW NOTE
SW following for discharge planning. SW reviewed multiple discharge plan options and reviewed Aidin referrals. At this time, there are no available BETTIE. SW re opened referral and extended to additional facilities.     At this time, there are no avail

## 2022-01-20 NOTE — PROGRESS NOTES
Daniel Pepper is a 39year old male admitted to [address] [place] [address] on 1/12 with gangrene of feet.       HPI:    Patient in covid isolation,   Seen from the doorway  Previous entries noted,   Physical exam deferred to conserve PPE    Allergies:    Mushrooms               I History reviewed. No pertinent family history.    Social History    Tobacco Use      Smoking status: Current Every Day Smoker        Packs/day: 0.50        Types: Cigarettes      Smokeless tobacco: Never Used    Vaping Use      Vaping Use: Not on file 15. 4 (H) 11.6 - 14.8 seconds    INR 1.20 0.80 - 1.20   Hepatic Function Panel (7)   Result Value Ref Range    AST 79 (H) 15 - 37 U/L     (H) 16 - 61 U/L    Alkaline Phosphatase 193 (H) 45 - 117 U/L    Bilirubin, Total 0.5 0.1 - 2.0 mg/dL    Bilirubi Potassium 3.8 3.5 - 5.1 mmol/L   CBC, Platelet;  No Differential   Result Value Ref Range    WBC 14.5 (H) 4.0 - 11.0 x10(3) uL    RBC 3.19 (L) 4.30 - 5.70 x10(6)uL    HGB 9.1 (L) 13.0 - 17.5 g/dL    HCT 26.7 (L) 39.0 - 53.0 %    MCV 83.7 80.0 - 100.0 fL Phosphatase 139 (H) 45 - 117 U/L    Bilirubin, Total 0.5 0.1 - 2.0 mg/dL    Bilirubin, Direct 0.2 0.0 - 0.2 mg/dL    Total Protein 5.1 (L) 6.4 - 8.2 g/dL    Albumin 1.6 (L) 3.4 - 5.0 g/dL   Basic Metabolic Panel (8)   Result Value Ref Range    Glucose 96 7 Lymphocyte % Manual 11 %    Monocyte % Manual 8 %    Eosinophil % Manual 2 %    Basophil % Manual 0 %    Metamyelocyte % 1 %    Myelocyte % 3 %    Total Cells Counted 100     RBC Morphology See morphology below (A) Normal, Slide reviewed, see previous RBC 21.0 - 32.0 mmol/L    Anion Gap 8 0 - 18 mmol/L    BUN 26 (H) 7 - 18 mg/dL    Creatinine 7.40 (H) 0.70 - 1.30 mg/dL    BUN/CREA Ratio 3.5 (L) 10.0 - 20.0    Calcium, Total 8.6 8.5 - 10.1 mg/dL    Calculated Osmolality 283 275 - 295 mOsm/kg    GFR, Non-Afri Result Value Ref Range    Hepatitis C Virus Nonreactive  Nonreactive    SERUM MONOCLONAL PROTEIN STUDY   Result Value Ref Range    Total Protein 5.3 (L) 6.4 - 8.2 g/dL    Albumin 2.02 (L) 3.75 - 5.21 g/dL    Alpha-1-Globulins 0.65 (H) 0.19 - 0.46 g/dL Result Value Ref Range    Glucose 75 70 - 99 mg/dL    Sodium 133 (L) 136 - 145 mmol/L    Potassium 4.1 3.5 - 5.1 mmol/L    Chloride 105 98 - 112 mmol/L    CO2 15.0 (L) 21.0 - 32.0 mmol/L    Anion Gap 13 0 - 18 mmol/L    BUN 33 (H) 7 - 18 mg/dL    Creatin Ref Range    Glucose 76 70 - 99 mg/dL    Sodium 133 (L) 136 - 145 mmol/L    Potassium 4.7 3.5 - 5.1 mmol/L    Chloride 102 98 - 112 mmol/L    CO2 20.0 (L) 21.0 - 32.0 mmol/L    Anion Gap 11 0 - 18 mmol/L    BUN 40 (H) 7 - 18 mg/dL    Creatinine 10.70 (H) 0 Hepatitis B Surface Antigen Nonreactive  Nonreactive     Hepatitis B Surface Antibody Nonreactive  Nonreactive     Heptatitis B Surface Ab Quant <3.10 mIU/mL    Hepatitis B Core Ab,Total Nonreactive  Nonreactive    Basic Metabolic Panel (8)   Result Value 01/13/2022 10:32 AM                                 Operating Room                                                               Pathologist:           Cally Velez MD                                                        Specimens:   A) - Foot,left from medial to lateral, 2.8 cm from dorsal to plantar. The proximal one third is devoid of skin and soft tissue. The skin presents a general green-black appearance with a leathery consistency. The nail is not present.  The skin, soft tissue and bone margins medial to lateral, 1.8 cm from dorsal to plantar. The proximal one half is devoid of skin and soft tissue. The distal skin presents a general black-red appearance with a leathery consistency. The nail is not present.  The skin, soft tissue and bone margins section through distal phalanx for decalcification. The second digit, consistent with the fifth toe (distal, mid and proximal phalanges) and measures 4.4 cm in length, 2.0 cm from medial to lateral, 1.5 cm from dorsal to plantar.  The proximal one half is Information       Mario Requiring Dialysis, Needs Random Kidney Biopsy          Gross Description       The specimen is received without fixative labeled \"Michael, left kidney\" consists of three cylindrical fragments of tan-white soft tissue measuring 0.1 cm Aerobic Culture Result 2+ growth Staphylococcus aureus (A)     Aerobic Culture Result 4+ growth Alcaligenes faecalis (A)     Aerobic Smear 1+ WBCs seen     Aerobic Smear 1+ Gram Positive Rods        Susceptibility    Staphylococcus aureus -  (no method judy difficile(toxigenic)PCR    Specimen: Stool   Result Value Ref Range    C.  Difficile Toxin B Gene Negative Negative   CBC W/ DIFFERENTIAL   Result Value Ref Range    WBC 24.1 (H) 4.0 - 11.0 x10(3) uL    RBC 3.16 (L) 4.30 - 5.70 x10(6)uL    HGB 9.2 (L) 13.0 HCT 24.5 (L) 39.0 - 53.0 %    MCV 81.4 80.0 - 100.0 fL    MCH 28.2 26.0 - 34.0 pg    MCHC 34.7 31.0 - 37.0 g/dL    RDW-SD 43.3 35.1 - 46.3 fL    RDW 14.6 11.0 - 15.0 %    .0 (H) 150.0 - 450.0 10(3)uL    Neutrophil Absolute Prelim 6.75 1.50 - 7.70 in NAD. HEENT:  Normocephalic, no scleral abnormalities. Oropharynx clear, trachea ML. NECK:  Supple, no masses, no lymphadenopathy. LUNGS:  Clear to auscultation b/l, no rhonchi, rales, or wheezes.   CARDIO: RRR B4/Q9, no rubs, clicks, heaves, or murmu

## 2022-01-20 NOTE — PROGRESS NOTES
[facility] Hospitalist Progress Note     CC: Hospital Follow up    PCP: None Pcp     Assessment/Plan:     Principal Problem:    Frostbite of both feet, initial encounter  Active Problems:    Gangrene of toe of both feet [facility])    Mr. Maria Del Carmen Durand is a 39 evaluated, good blood flow, no intervention needed  podiatry following additional amputation of the right foot toes may be required but at this time does not appear to need further surgery  Podiatry signed off and recommended continued wound care, seen by (36.6 °C)] 97.6 °F (36.4 °C)  Pulse:  [66-82] 76  Resp:  [15-18] 18  BP: (110-138)/(81-94) 138/82      Intake/Output:    Intake/Output Summary (Last 24 hours) at 1/20/2022 1229  Last data filed at 1/20/2022 0850  Gross per 24 hour   Intake 580 ml   Output Lab 01/14/22  1156 01/15/22  1513 01/16/22  1356 01/18/22  0544   ALT 66*  --   --   --    AST 37  --   --   --    ALB 1.6*  --  2.02* 1.4*   LDH  --  220  --   --          Imaging:  IR TUNNELED CV CATH INSERTION EXCHANGE CHECK    Result Date: 1/19/2022

## 2022-01-21 LAB
ANION GAP SERPL CALC-SCNC: 6 MMOL/L (ref 0–18)
BUN BLD-MCNC: 20 MG/DL (ref 7–18)
BUN/CREAT SERPL: 3 (ref 10–20)
CALCIUM BLD-MCNC: 8.7 MG/DL (ref 8.5–10.1)
CHLORIDE SERPL-SCNC: 101 MMOL/L (ref 98–112)
CO2 SERPL-SCNC: 28 MMOL/L (ref 21–32)
CREAT BLD-MCNC: 6.62 MG/DL
GLUCOSE BLD-MCNC: 85 MG/DL (ref 70–99)
OSMOLALITY SERPL CALC.SUM OF ELEC: 282 MOSM/KG (ref 275–295)
POTASSIUM SERPL-SCNC: 4.3 MMOL/L (ref 3.5–5.1)
SODIUM SERPL-SCNC: 135 MMOL/L (ref 136–145)

## 2022-01-21 RX ORDER — FAMOTIDINE 10 MG
10 TABLET ORAL EVERY EVENING
Status: DISCONTINUED | OUTPATIENT
Start: 2022-01-22 | End: 2022-01-27

## 2022-01-21 RX ORDER — PREDNISONE 20 MG/1
60 TABLET ORAL
Status: DISCONTINUED | OUTPATIENT
Start: 2022-01-21 | End: 2022-01-27

## 2022-01-21 NOTE — CDS QUERY
Clarification – Significance of Diagnostic Report/Results  Faye Leiva  Dear Doctor:  Clinical information (provided below) suggests a diagnosis in ID notes and BCx result.  For accurate ICD-10-CM code assignment to reflect sev

## 2022-01-21 NOTE — PROGRESS NOTES
[facility] Hospitalist Progress Note     CC: Hospital Follow up    PCP: None Pcp     Assessment/Plan:     Principal Problem:    Frostbite of both feet, initial encounter  Active Problems:    Gangrene of toe of both feet [facility])    Mr. Tono Cronin is a 39 PICC per ID - duration of antibiotics per ID   Vascular evaluated, good blood flow, no intervention needed  podiatry following additional amputation of the right foot toes may be required but at this time does not appear to need further surgery  Podiatry rate 16, height 5' 8\" (1.727 m), weight 145 lb 6.4 oz (66 kg), SpO2 99 %.     Temp:  [97.2 °F (36.2 °C)-98.3 °F (36.8 °C)] 97.2 °F (36.2 °C)  Pulse:  [70-83] 83  Resp:  [16-18] 16  BP: (125-152)/(86-91) 125/91      Intake/Output:    Intake/Output Summary ( 103 101   CO2 24.0 24.0 28.0       Recent Labs   Lab 01/15/22  1513 01/16/22  1356 01/18/22  0544   ALB  --  2.02* 1.4*     --   --          Imaging:  IR TUNNELED CV CATH INSERTION EXCHANGE CHECK    Result Date: 1/19/2022  CONCLUSION: Tunneled perma

## 2022-01-21 NOTE — CDS QUERY
Covid-19 Clarification  Farzad Henao    Dear Doctor:  Clinical information (provided below) includes documentation of a recent Covid-19 Infection and an acute COVID infection with Pneumonia.  For accurate ICD-10-CM code assignme

## 2022-01-21 NOTE — PROGRESS NOTES
[facility] [facility]    Nephrology Progress Note      SUBJECTIVE:      Tolerated dialysis yesterday   No new complaints this AM     PHYSICAL EXAM:     Vital Signs: BP (!) 125/91 (BP Location: Right arm)   Pulse 83   Temp 97.2 °F (36.2 °C) (Oral)   Re 01/18/2022    NEPERCENT 72.1 01/19/2022    LYPERCENT 13.9 01/19/2022    MOPERCENT 9.1 01/19/2022    EOPERCENT 0.9 01/19/2022    BAPERCENT 0.3 01/19/2022    NE 5.44 01/19/2022    LYMABS 1.05 01/19/2022    MOABSO 0.69 01/19/2022    EOABSO 0.07 01/19/2022 (MERREM) 500 mg in sodium chloride 0.9% 100 mL IVPB-MBP, 500 mg, Intravenous, Q24H  [Held by provider] Heparin Sodium (Porcine) 5000 UNIT/ML injection 5,000 Units, 5,000 Units, Subcutaneous, Q8H  acetaminophen (TYLENOL) tab 650 mg, 650 mg, Oral, Q8H  levET unremarkable. - Absence of thrombocytopenia would speak against a TMA. - blood transfusions per primary      #. COVID-19 on 1/3/2022  - per primary / ID      Dw ID and hospitalist     We will continue to follow.       Faye Parikh MD  [facility] and

## 2022-01-21 NOTE — PLAN OF CARE
Problem: Patient Centered Care  Goal: Patient preferences are identified and integrated in the patient's plan of care  Description: Interventions:  - What would you like us to know as we care for you?  From home with mom  - Provide timely, complete, and a Absence of fever/infection during anticipated neutropenic period  Description: INTERVENTIONS  - Monitor WBC  - Administer growth factors as ordered  - Implement neutropenic guidelines  Outcome: Progressing     Problem: SAFETY ADULT - FALL  Goal: Free from Monitor for areas of redness and/or skin breakdown  - Initiate interventions, skin care algorithm/standards of care as needed  Outcome: Progressing  Goal: Incision(s), wounds(s) or drain site(s) healing without S/S of infection  Description: INTERVENTIONS:

## 2022-01-22 LAB
ANION GAP SERPL CALC-SCNC: 6 MMOL/L (ref 0–18)
BUN BLD-MCNC: 13 MG/DL (ref 7–18)
BUN/CREAT SERPL: 3 (ref 10–20)
CALCIUM BLD-MCNC: 8.6 MG/DL (ref 8.5–10.1)
CHLORIDE SERPL-SCNC: 102 MMOL/L (ref 98–112)
CO2 SERPL-SCNC: 31 MMOL/L (ref 21–32)
CREAT BLD-MCNC: 4.38 MG/DL
GLUCOSE BLD-MCNC: 78 MG/DL (ref 70–99)
OSMOLALITY SERPL CALC.SUM OF ELEC: 287 MOSM/KG (ref 275–295)
POTASSIUM SERPL-SCNC: 3.9 MMOL/L (ref 3.5–5.1)
SODIUM SERPL-SCNC: 139 MMOL/L (ref 136–145)

## 2022-01-22 NOTE — PROGRESS NOTES
[facility] Hospitalist Progress Note     CC: Hospital Follow up    PCP: None Pcp     Assessment/Plan:     Principal Problem:    Frostbite of both feet, initial encounter  Active Problems:    Gangrene of toe of both feet [facility])    Mr. Irais Connelly is a 39 1 blood culture with Clostridium-Bran cultures negative  Antibiotics per ID changed Zosyn to meropenem  Stopped Vanco   PICC per ID - duration of antibiotics per ID   Vascular evaluated, good blood flow, no intervention needed  podiatry following additio °C), temperature source Oral, resp. rate 18, height 5' 8\" (1.727 m), weight 165 lb 11.2 oz (75.2 kg), SpO2 99 %.     Temp:  [97.6 °F (36.4 °C)-98.4 °F (36.9 °C)] 97.7 °F (36.5 °C)  Pulse:  [66-84] 72  Resp:  [16-18] 18  BP: (120-138)/(80-89) 122/84      In 18*   GFRNAA 6* 9* 16*   CA 8.6 8.7 8.6   * 135* 139   K 4.4 4.3 3.9    101 102   CO2 24.0 28.0 31.0       Recent Labs   Lab 01/15/22  1513 01/16/22  1356 01/18/22  0544   ALB  --  2.02* 1.4*     --   --          Imaging:  No results fou

## 2022-01-22 NOTE — PLAN OF CARE
Patient A&Ox4 , vital signs stable during shift with no acute changes. Patient had HD done 1/21 with 1L of fluid removed per HD RN. Heparin is currently being held by provider. Denies any SOB or pain during shift.  Patient has bed in lowest position, call l Manage/alleviate anxiety  - Utilize distraction and/or relaxation techniques  - Monitor for opioid side effects  - Notify MD/LIP if interventions unsuccessful or patient reports new pain  - Anticipate increased pain with activity and pre-medicate as approp prevention bundle as indicated  Outcome: Progressing     Problem: DISCHARGE PLANNING  Goal: Discharge to home or other facility with appropriate resources  Description: INTERVENTIONS:  - Identify barriers to discharge w/pt and caregiver  - Include patient/ and nutrition restrictions as appropriate  Outcome: Progressing

## 2022-01-22 NOTE — PROGRESS NOTES
[facility]    Nephrology Progress Note      SUBJECTIVE:      Tolerated dialysis yesterday   No new complaints this AM     PHYSICAL EXAM:     Vital Signs: /85 (BP Location: Right arm)   Pulse 62   Temp 97.6 °F (36.4 °C) (Oral)   Resp 1 01/18/2022    NEPERCENT 72.1 01/19/2022    LYPERCENT 13.9 01/19/2022    MOPERCENT 9.1 01/19/2022    EOPERCENT 0.9 01/19/2022    BAPERCENT 0.3 01/19/2022    NE 5.44 01/19/2022    LYMABS 1.05 01/19/2022    MOABSO 0.69 01/19/2022    EOABSO 0.07 01/19/2022 (SILVADENE) 1 % cream, , Topical, BID  Albumin Human (ALBUMINAR) 25 % solution 100 mL, 100 mL, Intravenous, PRN  Meropenem (MERREM) 500 mg in sodium chloride 0.9% 100 mL IVPB-MBP, 500 mg, Intravenous, Q24H  [Held by provider] Heparin Sodium (Porcine) 5000 against a TMA. - blood transfusions per primary      #.  COVID-19 on 1/3/2022  - per primary / ID      Tamara Buckley MD  [facility]  Nephrology

## 2022-01-22 NOTE — PLAN OF CARE
Problem: Patient Centered Care  Goal: Patient preferences are identified and integrated in the patient's plan of care  Description: Interventions:  - What would you like us to know as we care for you?  From home with mom  - Provide timely, complete, and a Absence of fever/infection during anticipated neutropenic period  Description: INTERVENTIONS  - Monitor WBC  - Administer growth factors as ordered  - Implement neutropenic guidelines  Outcome: Progressing     Problem: SAFETY ADULT - FALL  Goal: Free from Monitor for areas of redness and/or skin breakdown  - Initiate interventions, skin care algorithm/standards of care as needed  Outcome: Progressing  Goal: Incision(s), wounds(s) or drain site(s) healing without S/S of infection  Description: INTERVENTIONS: diet. Patient receives intravenous antibiotics per order. Bed and chair alarm remains in use at all times to ensure safety. Call light within reach at all times.

## 2022-01-22 NOTE — PROGRESS NOTES
[facility] AND CLINICS  [place] Infectious Disease Progress Note    Mohsen Manning Patient Status:  Inpatient    1980 MRN [id number]   Location [facility] 5SW/SE Attending Brody Salazar, [address] [facility] Day # 9 PCP None Pcp     Subjective:  Pt with no new (1.727 m), weight 165 lb 11.2 oz (75.2 kg), SpO2 99 %.    Temp (24hrs), Av.9 °F (36.6 °C), Min:97.6 °F (36.4 °C), Max:98.4 °F (36.9 °C)        Labs:  Lab Results   Component Value Date    CREATSLASHAWN 4.38 2022    BUN 13 2022

## 2022-01-23 LAB
ALBUMIN SERPL-MCNC: 1.7 G/DL (ref 3.4–5)
ANION GAP SERPL CALC-SCNC: 9 MMOL/L (ref 0–18)
BUN BLD-MCNC: 17 MG/DL (ref 7–18)
BUN/CREAT SERPL: 3.9 (ref 10–20)
CALCIUM BLD-MCNC: 8.8 MG/DL (ref 8.5–10.1)
CHLORIDE SERPL-SCNC: 100 MMOL/L (ref 98–112)
CO2 SERPL-SCNC: 28 MMOL/L (ref 21–32)
CREAT BLD-MCNC: 4.35 MG/DL
GLUCOSE BLD-MCNC: 105 MG/DL (ref 70–99)
MAGNESIUM SERPL-MCNC: 1.6 MG/DL (ref 1.6–2.6)
OSMOLALITY SERPL CALC.SUM OF ELEC: 286 MOSM/KG (ref 275–295)
PHOSPHATE SERPL-MCNC: 4.6 MG/DL (ref 2.5–4.9)
POTASSIUM SERPL-SCNC: 4.1 MMOL/L (ref 3.5–5.1)
SODIUM SERPL-SCNC: 137 MMOL/L (ref 136–145)

## 2022-01-23 NOTE — PROGRESS NOTES
[facility]    Nephrology Progress Note      SUBJECTIVE:     Urinating a lot more per patient.     PHYSICAL EXAM:     Vital Signs: /86 (BP Location: Right arm)   Pulse 66   Temp 98 °F (36.7 °C) (Oral)   Resp 18   Ht 5' 8\" (1.727 m)   W 01/19/2022    LYPERCENT 13.9 01/19/2022    MOPERCENT 9.1 01/19/2022    EOPERCENT 0.9 01/19/2022    BAPERCENT 0.3 01/19/2022    NE 5.44 01/19/2022    LYMABS 1.05 01/19/2022    MOABSO 0.69 01/19/2022    EOABSO 0.07 01/19/2022    BAABSO 0.02 01/19/2022     La BID  Albumin Human (ALBUMINAR) 25 % solution 100 mL, 100 mL, Intravenous, PRN  Meropenem (MERREM) 500 mg in sodium chloride 0.9% 100 mL IVPB-MBP, 500 mg, Intravenous, Q24H  [Held by provider] Heparin Sodium (Porcine) 5000 UNIT/ML injection 5,000 Units, 5,0

## 2022-01-23 NOTE — PROGRESS NOTES
[address] and [facility] Infectious Disease  Progress Note    Bunny Mejia Patient Status:  Inpatient    1980 MRN [id number]   Location [facility] 5SW/SE Attending Denzel Conteh MD   [facility] Day # 10 PCP None Pcp     Josephine Aguirre joints may reflect soft tissue emphysema.  This may reflect tissue necrosis given history of frostbite injury with superimposed gas forming infection not excluded.  Surgical evaluation advised. No definite radiographic evidence to suggest osteomyelitis. [phone]    1/23/2022  8:40 AM

## 2022-01-23 NOTE — PROGRESS NOTES
[facility] Hospitalist Progress Note     CC: Hospital Follow up    PCP: None Pcp     Assessment/Plan:     Principal Problem:    Frostbite of both feet, initial encounter  Active Problems:    Gangrene of toe of both feet [facility])    Mr. Tono Cronin is a 39 meropenem  Stopped Vanco   PICC per ID - duration of antibiotics per ID   Vascular evaluated, good blood flow, no intervention needed  podiatry following additional amputation of the right foot toes may be required but at this time does not appear to need SpO2 94 %.     Temp:  [97.5 °F (36.4 °C)-98 °F (36.7 °C)] 98 °F (36.7 °C)  Pulse:  [62-72] 66  Resp:  [18] 18  BP: (122-138)/(82-86) 122/86      Intake/Output:    Intake/Output Summary (Last 24 hours) at 1/23/2022 1006  Last data filed at 1/23/2022 0801  Gr 28.0       Recent Labs   Lab 01/16/22  1356 01/18/22  0544 01/23/22  0450   ALB 2.02* 1.4* 1.7*         Imaging:  No results found.       Meds:     • predniSONE  60 mg Oral Daily with breakfast   • famotidine  10 mg Oral QPM   • vancomycin  125 mg Oral Marcus

## 2022-01-24 LAB
ALBUMIN SERPL-MCNC: 1.8 G/DL (ref 3.4–5)
ANION GAP SERPL CALC-SCNC: 8 MMOL/L (ref 0–18)
BUN BLD-MCNC: 22 MG/DL (ref 7–18)
BUN/CREAT SERPL: 5.9 (ref 10–20)
CALCIUM BLD-MCNC: 8.6 MG/DL (ref 8.5–10.1)
CHLORIDE SERPL-SCNC: 103 MMOL/L (ref 98–112)
CO2 SERPL-SCNC: 28 MMOL/L (ref 21–32)
CREAT BLD-MCNC: 3.75 MG/DL
GLUCOSE BLD-MCNC: 99 MG/DL (ref 70–99)
MAGNESIUM SERPL-MCNC: 1.2 MG/DL (ref 1.6–2.6)
OSMOLALITY SERPL CALC.SUM OF ELEC: 291 MOSM/KG (ref 275–295)
PHOSPHATE SERPL-MCNC: 5 MG/DL (ref 2.5–4.9)
POTASSIUM SERPL-SCNC: 4.1 MMOL/L (ref 3.5–5.1)
SODIUM SERPL-SCNC: 139 MMOL/L (ref 136–145)

## 2022-01-24 NOTE — PAYOR COMM NOTE
--------------  CONTINUED STAY REVIEW    Payor: Nikko Hines [facility]/ICP  Subscriber #:  [id number]  Authorization Number: [id number]    Admit date: 1/13/22  Admit time:  3:51 AM    Admitting Physician: Maddison Crowley MD  Attending Physician:  Junaid Chavez Pulse Resp BP SpO2 Weight O2 Device O2 Flow Rate (L/min) Who    01/24/22 0810 97.8 °F (36.6 °C) 60 18 132/78 99 % — None (Room air) — CE    01/24/22 0513 — — — — — 162 lb 12.8 oz — — KS    01/24/22 0452 97.3 °F (36.3 °C) 56 18 116/73 97 % — None (Room air) Feb per Podiatry. ID rec picc and IV abx (picc ok per renal as REYNALDO and hoping for improvement). HD catheter placed 1/18 and plan for HD after.  Renal biopsy with acute interstitial nephritis, started on prednisone 60 mg daily 1/21/22.      Anuric renal fa was started on it 1 year ago  -Keppra level pending  -Continue Keppra     SDH with previous [facility]  -CT head ordered  -neurology consult as requested by anesthesia, appreciate consult     Recent COVID 1/3  -rapid covid negative on admission 1/12     No glycemic control for optimal healing  6. REYNALDO  -worsening Cr  -HD started  -nephrology on consult  -s/p renal biopsy today  -plans for steroids, PCP prophylaxis with bactrim SS q48hrs (discussed with dr Iantha Sacks and terry)  7.   Hx of seizure  -likely fro ongoing.     Objective:  Blood pressure 122/86, pulse 66, temperature 98 °F (36.7 °C), temperature source Oral, resp.  rate 18, height 5' 8\" (1.727 m), weight 165 lb 11.2 oz (75.2 kg), SpO2 94 %.     Intake/Output:     Intake/Output Summary (Last 24 hours) spent at patient's bedside today in examination and coordination of today's plan of care.     1/23 HOSPITALIST NOTE  Assessment/Plan:      Principal Problem:    Frostbite of both feet, initial encounter  Active Problems:    Gangrene of toe of both feet ([facility] ID changed Zosyn to meropenem  Stopped Vanco   PICC per ID - duration of antibiotics per ID   Vascular evaluated, good blood flow, no intervention needed  podiatry following additional amputation of the right foot toes may be required but at this time does filed at 1/23/2022 0801      Gross per 24 hour   Intake 240 ml   Output 575 ml   Net -335 ml      ASSESSMENT AND PLAN:   #.  Anuric REYNALDO   - Creatinine was 0.62 on 1/13 and continued to worsen  - initiated on iHD on 1/18 when creatinine increased to 10.7   -

## 2022-01-24 NOTE — PROGRESS NOTES
[facility]    Nephrology Progress Note      SUBJECTIVE:     Renal function improving off dialysis    PHYSICAL EXAM:     Vital Signs: /78 (BP Location: Right arm)   Pulse 60   Temp 97.8 °F (36.6 °C) (Axillary)   Resp 18   Ht 5' 8\" (1. NEPERCENT 72.1 01/19/2022    LYPERCENT 13.9 01/19/2022    MOPERCENT 9.1 01/19/2022    EOPERCENT 0.9 01/19/2022    BAPERCENT 0.3 01/19/2022    NE 5.44 01/19/2022    LYMABS 1.05 01/19/2022    MOABSO 0.69 01/19/2022    EOABSO 0.07 01/19/2022    BAABSO 0.02 01 Topical, BID  Albumin Human (ALBUMINAR) 25 % solution 100 mL, 100 mL, Intravenous, PRN  Meropenem (MERREM) 500 mg in sodium chloride 0.9% 100 mL IVPB-MBP, 500 mg, Intravenous, Q24H  [Held by provider] Heparin Sodium (Porcine) 5000 UNIT/ML injection 5,000 U

## 2022-01-24 NOTE — PLAN OF CARE
Problem: Patient Centered Care  Goal: Patient preferences are identified and integrated in the patient's plan of care  Description: Interventions:  - What would you like us to know as we care for you?  From home with mom  - Provide timely, complete, and a Absence of fever/infection during anticipated neutropenic period  Description: INTERVENTIONS  - Monitor WBC  - Administer growth factors as ordered  - Implement neutropenic guidelines  Outcome: Progressing     Problem: SAFETY ADULT - FALL  Goal: Free from Monitor for areas of redness and/or skin breakdown  - Initiate interventions, skin care algorithm/standards of care as needed  Outcome: Progressing  Goal: Incision(s), wounds(s) or drain site(s) healing without S/S of infection  Description: INTERVENTIONS: per order. Tolerates diet well. Fall risk precautions are followed per protocol. Bed and chair alarm remains in use at all time. Call light within reach.

## 2022-01-24 NOTE — PROGRESS NOTES
[address] and [facility] Infectious Disease  Progress Note    Bunny Mejia Patient Status:  Inpatient    1980 MRN [id number]   Location [facility] 5SW/SE Attending Jaclyn Weaver, [address] [facility] Day # 11 PCP None Pcp     Subjective the MCP joints may reflect soft tissue emphysema.  This may reflect tissue necrosis given history of frostbite injury with superimposed gas forming infection not excluded.  Surgical evaluation advised.  No definite radiographic evidence to suggest osteomyel coordination of today's plan of care. Lani Jeffrey [facility] Infectious Disease  [phone]    1/24/2022  10:17 AM

## 2022-01-24 NOTE — PROGRESS NOTES
[facility] Hospitalist Progress Note     CC: Hospital Follow up    PCP: None Pcp     Assessment/Plan:     Principal Problem:    Frostbite of both feet, initial encounter  Active Problems:    Gangrene of toe of both feet [facility])    Mr. Grecia Quach is a 39 changed Zosyn to meropenem  Stopped Vanco   PICC per ID - duration of antibiotics per ID - may need additional surgery   Vascular evaluated, good blood flow, no intervention needed  podiatry following additional amputation of the right foot toes may be req at 1/24/2022 0901  Gross per 24 hour   Intake 600 ml   Output 1180 ml   Net -580 ml       Last 3 Weights  01/24/22 0513 : 162 lb 12.8 oz (73.8 kg)  01/22/22 0506 : 165 lb 11.2 oz (75.2 kg)  01/20/22 0826 : 145 lb 6.4 oz (66 kg)  01/19/22 0458 : 150 lb 14. 4 Q24H   • [Held by provider] Heparin Sodium (Porcine)  5,000 Units Subcutaneous Q8H   • acetaminophen  650 mg Oral Q8H   • levETIRAcetam  1,000 mg Oral BID   • thiamine  100 mg Oral Daily   • multivitamin with minerals  1 tablet Oral Daily   • folic acid  1

## 2022-01-25 ENCOUNTER — APPOINTMENT (OUTPATIENT)
Dept: INTERVENTIONAL RADIOLOGY/VASCULAR | Facility: HOSPITAL | Age: 42
DRG: 853 | End: 2022-01-25
Attending: INTERNAL MEDICINE
Payer: MEDICAID

## 2022-01-25 LAB
ANION GAP SERPL CALC-SCNC: 8 MMOL/L (ref 0–18)
BUN BLD-MCNC: 21 MG/DL (ref 7–18)
BUN/CREAT SERPL: 7.9 (ref 10–20)
CALCIUM BLD-MCNC: 8.5 MG/DL (ref 8.5–10.1)
CHLORIDE SERPL-SCNC: 103 MMOL/L (ref 98–112)
CO2 SERPL-SCNC: 29 MMOL/L (ref 21–32)
CREAT BLD-MCNC: 2.66 MG/DL
GLUCOSE BLD-MCNC: 91 MG/DL (ref 70–99)
OSMOLALITY SERPL CALC.SUM OF ELEC: 293 MOSM/KG (ref 275–295)
POTASSIUM SERPL-SCNC: 3.8 MMOL/L (ref 3.5–5.1)
SODIUM SERPL-SCNC: 140 MMOL/L (ref 136–145)

## 2022-01-25 RX ORDER — SULFAMETHOXAZOLE AND TRIMETHOPRIM 800; 160 MG/1; MG/1
1 TABLET ORAL
Status: DISCONTINUED | OUTPATIENT
Start: 2022-01-26 | End: 2022-01-27

## 2022-01-25 NOTE — PROGRESS NOTES
[facility] Hospitalist Progress Note     CC: Hospital Follow up    PCP: None Pcp     Assessment/Plan:     Principal Problem:    Frostbite of both feet, initial encounter  Active Problems:    Gangrene of toe of both feet [facility])    Mr. Armaan Hills is a 39 cultures negative  Antibiotics per ID changed Zosyn to meropenem  Will need prolonged meropenem  PICC per ID - duration of antibiotics per ID - may need additional surgery   Needs podiatry follow-up at [facility]  Vascular evaluated, good blood flow, no intervent hours) at 1/25/2022 1241  Last data filed at 1/25/2022 0900  Gross per 24 hour   Intake 870 ml   Output 1880 ml   Net -1010 ml       Last 3 Weights  01/25/22 0637 : 162 lb 6.4 oz (73.7 kg)  01/24/22 0513 : 162 lb 12.8 oz (73.8 kg)  01/22/22 0506 : 165 lb 1 5,000 Units Subcutaneous Q8H   • acetaminophen  650 mg Oral Q8H   • levETIRAcetam  1,000 mg Oral BID   • thiamine  100 mg Oral Daily   • multivitamin with minerals  1 tablet Oral Daily   • folic acid  1 mg Oral Daily       albumin human 25%, influenza viru

## 2022-01-25 NOTE — PROGRESS NOTES
[facility]    Nephrology Progress Note      SUBJECTIVE:     Renal function improving off dialysis    PHYSICAL EXAM:     Vital Signs: /78 (BP Location: Right arm)   Pulse 82   Temp 98.7 °F (37.1 °C) (Oral)   Resp 18   Ht 5' 8\" (1.727 72.1 01/19/2022    LYPERCENT 13.9 01/19/2022    MOPERCENT 9.1 01/19/2022    EOPERCENT 0.9 01/19/2022    BAPERCENT 0.3 01/19/2022    NE 5.44 01/19/2022    LYMABS 1.05 01/19/2022    MOABSO 0.69 01/19/2022    EOABSO 0.07 01/19/2022    BAABSO 0.02 01/19/2022 BID  Albumin Human (ALBUMINAR) 25 % solution 100 mL, 100 mL, Intravenous, PRN  Meropenem (MERREM) 500 mg in sodium chloride 0.9% 100 mL IVPB-MBP, 500 mg, Intravenous, Q24H  [Held by provider] Heparin Sodium (Porcine) 5000 UNIT/ML injection 5,000 Units, 5,0

## 2022-01-25 NOTE — PLAN OF CARE
Problem: PAIN - ADULT  Goal: Verbalizes/displays adequate comfort level or patient's stated pain goal  Description: INTERVENTIONS:  - Encourage pt to monitor pain and request assistance  - Assess pain using appropriate pain scale  - Administer analgesics sites and surrounding tissue  - Implement wound care per orders  - Initiate isolation precautions as appropriate  - Initiate Pressure Ulcer prevention bundle as indicated  Outcome: Progressing     Problem: METABOLIC/FLUID AND ELECTROLYTES - ADULT  Goal: He

## 2022-01-25 NOTE — PROGRESS NOTES
[address] [facility] and [facility] Infectious Disease  Progress Note    Giovanni Casas Patient Status:  Inpatient    1980 MRN [id number]   Location [facility] Attending Leonides Donato, [address] [facility] Day # 12 PCP None Pcp necrosis given history of frostbite injury with superimposed gas forming infection not excluded.  Surgical evaluation advised.  No definite radiographic evidence to suggest osteomyelitis.       1.1 cm nonspecific lucency suggested in the navicular.  Correla

## 2022-01-25 NOTE — CM/SW NOTE
CM was notified in dc rounds that pt no longer is needing HD. Per MD note tunnelled cath to be removed. Per ID plan is for IV abx thru at least 2/24.  Per ID \"Patient will categorically require ECF for complex wound care and IV antibiotics as above\"

## 2022-01-26 LAB
ALBUMIN SERPL-MCNC: 1.9 G/DL (ref 3.4–5)
ANION GAP SERPL CALC-SCNC: 5 MMOL/L (ref 0–18)
BUN BLD-MCNC: 19 MG/DL (ref 7–18)
BUN/CREAT SERPL: 9.5 (ref 10–20)
CALCIUM BLD-MCNC: 8.5 MG/DL (ref 8.5–10.1)
CHLORIDE SERPL-SCNC: 104 MMOL/L (ref 98–112)
CO2 SERPL-SCNC: 30 MMOL/L (ref 21–32)
CREAT BLD-MCNC: 2 MG/DL
DEPRECATED RDW RBC AUTO: 42.7 FL (ref 35.1–46.3)
ERYTHROCYTE [DISTWIDTH] IN BLOOD BY AUTOMATED COUNT: 14.6 % (ref 11–15)
GLUCOSE BLD-MCNC: 84 MG/DL (ref 70–99)
HCT VFR BLD AUTO: 20.5 %
HGB BLD-MCNC: 7 G/DL
MAGNESIUM SERPL-MCNC: 1.4 MG/DL (ref 1.6–2.6)
MCH RBC QN AUTO: 28 PG (ref 26–34)
MCHC RBC AUTO-ENTMCNC: 34.1 G/DL (ref 31–37)
MCV RBC AUTO: 82 FL
OSMOLALITY SERPL CALC.SUM OF ELEC: 289 MOSM/KG (ref 275–295)
PHOSPHATE SERPL-MCNC: 4.4 MG/DL (ref 2.5–4.9)
PLATELET # BLD AUTO: 440 10(3)UL (ref 150–450)
POTASSIUM SERPL-SCNC: 3.4 MMOL/L (ref 3.5–5.1)
POTASSIUM SERPL-SCNC: 4.1 MMOL/L (ref 3.5–5.1)
RBC # BLD AUTO: 2.5 X10(6)UL
SODIUM SERPL-SCNC: 139 MMOL/L (ref 136–145)
WBC # BLD AUTO: 10 X10(3) UL (ref 4–11)

## 2022-01-26 RX ORDER — POTASSIUM CHLORIDE 20 MEQ/1
40 TABLET, EXTENDED RELEASE ORAL ONCE
Status: COMPLETED | OUTPATIENT
Start: 2022-01-26 | End: 2022-01-26

## 2022-01-26 RX ORDER — MAGNESIUM OXIDE 400 MG (241.3 MG MAGNESIUM) TABLET
800 TABLET ONCE
Status: COMPLETED | OUTPATIENT
Start: 2022-01-26 | End: 2022-01-26

## 2022-01-26 NOTE — PROGRESS NOTES
[facility] Hospitalist Progress Note     CC: Hospital Follow up    PCP: None Pcp     Assessment/Plan:     Principal Problem:    Frostbite of both feet, initial encounter  Active Problems:    Gangrene of toe of both feet [facility])    Mr. Sj Ang is a 39 cultures negative  Antibiotics per ID changed Zosyn to meropenem  Will need prolonged meropenem  PICC per ID - duration of antibiotics per ID - may need additional surgery   Needs podiatry follow-up at [facility]  Vascular evaluated, good blood flow, no intervent °C)  Pulse:  [57-66] 66  Resp:  [16-19] 19  BP: (122-138)/(78-91) 138/91      Intake/Output:    Intake/Output Summary (Last 24 hours) at 1/26/2022 1317  Last data filed at 1/26/2022 0851  Gross per 24 hour   Intake 440 ml   Output 1600 ml   Net -1160 ml 1/25/2022 at 2:51 PM     Finalized by (CST): Jimenez Rosado MD on 1/25/2022 at 2:53 PM              Meds:     • meropenem  500 mg Intravenous Q12H   • sulfamethoxazole-trimethoprim DS  1 tablet Oral Q MWF   • predniSONE  60 mg Oral Daily with breakfast

## 2022-01-26 NOTE — PROGRESS NOTES
[facility] Pharmacy Note:  Renal Adjustment for meropenem (MERREM)    Tyler Shearer is a 39year old patient who has been prescribed meropenem (MERREM) 500 mg every 24 hrs. The estimated creatinine clearance is 47 mL/min (A) (based on SCr of 2 mg/dL (H)).  The do

## 2022-01-26 NOTE — PROGRESS NOTES
[facility]    Nephrology Progress Note      SUBJECTIVE:     Renal function improving off dialysis    PHYSICAL EXAM:     Vital Signs: BP (!) 138/91 (BP Location: Right arm)   Pulse 66   Temp 97.9 °F (36.6 °C) (Oral)   Resp 19   Ht 5' 8\" (1. 01/19/2022    MOPERCENT 9.1 01/19/2022    EOPERCENT 0.9 01/19/2022    BAPERCENT 0.3 01/19/2022    NE 5.44 01/19/2022    LYMABS 1.05 01/19/2022    MOABSO 0.69 01/19/2022    EOABSO 0.07 01/19/2022    BAABSO 0.02 01/19/2022     Lab Results   Component Value D Oral, Daily  silver sulfADIAZINE (SILVADENE) 1 % cream, , Topical, BID  Albumin Human (ALBUMINAR) 25 % solution 100 mL, 100 mL, Intravenous, PRN  Meropenem (MERREM) 500 mg in sodium chloride 0.9% 100 mL IVPB-MBP, 500 mg, Intravenous, Q24H  [Held by provide

## 2022-01-26 NOTE — PLAN OF CARE
Problem: PAIN - ADULT  Goal: Verbalizes/displays adequate comfort level or patient's stated pain goal  Description: INTERVENTIONS:  - Encourage pt to monitor pain and request assistance  - Assess pain using appropriate pain scale  - Administer analgesics volume excess or deficit  - Monitor intake, output and patient weight  - Monitor urine specific gravity, serum osmolarity and serum sodium as indicated or ordered  - Monitor response to interventions for patient's volume status, including labs, urine outpu

## 2022-01-26 NOTE — PROGRESS NOTES
[facility] Patient Status:  Inpatient    1980 MRN [id number]   Location [facility] 5SW/SE Attending Candis Lopez, MD   [facility] Day # 15 PCP None Pcp     Date of Admission:  2022    History of Presen ages 7 months to 59 years inj 0.5ml, 0.5 mL, Intramuscular, Prior to discharge  LORazepam (ATIVAN) tab 1 mg, 1 mg, Oral, Q1H PRN   Or  LORazepam (ATIVAN) injection 1 mg, 1 mg, Intravenous, Q1H PRN   Or  LORazepam (ATIVAN) tab 2 mg, 2 mg, Oral, Q1H PRN   Or Component Value Date    WBC 10.0 01/26/2022    HGB 7.0 (L) 01/26/2022    HCT 20.5 (L) 01/26/2022    .0 01/26/2022    CREATSERUM 2.00 (H) 01/26/2022    BUN 19 (H) 01/26/2022     01/26/2022    K 4.1 01/26/2022     01/26/2022    CO2 30.0 and foot needs surgical debridement while inpatient at [address] [place] [address], podiatry will intervene if consulted again. Vascular is following peripherally and if more proximal intervention needed, this is beyond podiatry scope and vascular to be consulted.    Local wound

## 2022-01-26 NOTE — PROGRESS NOTES
[address] [facility] Infectious Disease  Progress Note    Lucero Bello Patient Status:  Inpatient    1980 MRN [id number]   Location [facility] 5SW/SE Attending Juve Hicks, [facility] Day # 15 PCP None Pcp     Subjective osteomyelitis.     CONCLUSION:       Subtle forefoot lucency soft tissues at the level of the MCP joints may reflect soft tissue emphysema.  This may reflect tissue necrosis given history of frostbite injury with superimposed gas forming infection not excl any time when ok with others and once all arranged. Lani Arrington, [facility] Infectious Disease  [phone]    1/26/2022  12:40 PM

## 2022-01-27 VITALS
WEIGHT: 163.19 LBS | OXYGEN SATURATION: 97 % | BODY MASS INDEX: 24.73 KG/M2 | HEIGHT: 68 IN | RESPIRATION RATE: 18 BRPM | HEART RATE: 73 BPM | SYSTOLIC BLOOD PRESSURE: 133 MMHG | DIASTOLIC BLOOD PRESSURE: 87 MMHG | TEMPERATURE: 99 F

## 2022-01-27 LAB
ALBUMIN SERPL-MCNC: 2 G/DL (ref 3.4–5)
ANION GAP SERPL CALC-SCNC: 5 MMOL/L (ref 0–18)
BUN BLD-MCNC: 16 MG/DL (ref 7–18)
BUN/CREAT SERPL: 9.1 (ref 10–20)
CALCIUM BLD-MCNC: 8.4 MG/DL (ref 8.5–10.1)
CHLORIDE SERPL-SCNC: 106 MMOL/L (ref 98–112)
CO2 SERPL-SCNC: 28 MMOL/L (ref 21–32)
CREAT BLD-MCNC: 1.75 MG/DL
GLUCOSE BLD-MCNC: 86 MG/DL (ref 70–99)
MAGNESIUM SERPL-MCNC: 1.6 MG/DL (ref 1.6–2.6)
OSMOLALITY SERPL CALC.SUM OF ELEC: 288 MOSM/KG (ref 275–295)
PHOSPHATE SERPL-MCNC: 3.8 MG/DL (ref 2.5–4.9)
POTASSIUM SERPL-SCNC: 3.8 MMOL/L (ref 3.5–5.1)
SODIUM SERPL-SCNC: 139 MMOL/L (ref 136–145)

## 2022-01-27 RX ORDER — PREDNISONE 20 MG/1
TABLET ORAL
Refills: 0 | Status: SHIPPED | COMMUNITY
Start: 2022-01-27

## 2022-01-27 RX ORDER — FAMOTIDINE 10 MG
10 TABLET ORAL EVERY EVENING
Qty: 30 TABLET | Refills: 0 | Status: SHIPPED | COMMUNITY
Start: 2022-01-27

## 2022-01-27 RX ORDER — SULFAMETHOXAZOLE AND TRIMETHOPRIM 800; 160 MG/1; MG/1
TABLET ORAL
Refills: 0 | Status: SHIPPED | COMMUNITY
Start: 2022-01-27

## 2022-01-27 RX ORDER — MAGNESIUM OXIDE 400 MG (241.3 MG MAGNESIUM) TABLET
400 TABLET ONCE
Status: DISCONTINUED | OUTPATIENT
Start: 2022-01-27 | End: 2022-01-27

## 2022-01-27 NOTE — CM/SW NOTE
01/27/22 1229   Discharge disposition   Expected discharge disposition [facility] Yane Provider   ([address])   Discharge transportation [facility]     Pt discussed during nursing rounds. Pt is stable for dc today.
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
## 2022-01-27 NOTE — PROGRESS NOTES
[facility]    Nephrology Progress Note      SUBJECTIVE:     Renal function improving off dialysis    PHYSICAL EXAM:     Vital Signs: /87 (BP Location: Right arm)   Pulse 73   Temp 98.7 °F (37.1 °C) (Oral)   Resp 18   Ht 5' 8\" (1.727 MOPERCENT 9.1 01/19/2022    EOPERCENT 0.9 01/19/2022    BAPERCENT 0.3 01/19/2022    NE 5.44 01/19/2022    LYMABS 1.05 01/19/2022    MOABSO 0.69 01/19/2022    EOABSO 0.07 01/19/2022    BAABSO 0.02 01/19/2022     Lab Results   Component Value Date    MALB tab 60 mg, 60 mg, Oral, Daily with breakfast  famotidine (PEPCID) tab 10 mg, 10 mg, Oral, QPM  vancomycin (VANCOCIN) cap 125 mg, 125 mg, Oral, Daily  silver sulfADIAZINE (SILVADENE) 1 % cream, , Topical, BID  Albumin Human (ALBUMINAR) 25 % solution 100 mL, 3 days  - then decrease dose to prednisone 20mg PO every day x 3 days  - then decrease dose to 10mg PO every day x 3 days  - then decrease dose to 5mg PO every day x 3 days  - then decrease to prednisone 2.5mg PO every day x 3 days  - then stop prednisone

## 2022-01-27 NOTE — PROGRESS NOTES
[address] [facility] Infectious Disease  Progress Note    Roni Campuzano Patient Status:  Inpatient    1980 MRN [id number]   Location [facility] 5SW/SE Attending Cony Au, [address] Day # 14 PCP None Pcp     Subjective reflect tissue necrosis given history of frostbite injury with superimposed gas forming infection not excluded.  Surgical evaluation advised.  No definite radiographic evidence to suggest osteomyelitis.       1.1 cm nonspecific lucency suggested in the [place]
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
## 2022-01-27 NOTE — PROGRESS NOTES
01/27/22 1351   Wound 01/13/22 Other (comment) Foot Left   Date First Assessed: 01/13/22   Present on Hospital Admission: Yes  Primary Wound Type:  Other (comment)  Location: Foot  Wound Location Orientation: Left  Wound Description (Comments): frostbite
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
## 2022-01-28 NOTE — PAYOR COMM NOTE
--------------  DISCHARGE REVIEW    Payor: Yanely Gardner FHP/ICP  Subscriber #:  [id number]  Authorization Number: [id number]    Admit date: 1/13/22  Admit time:   3:51 AM  Discharge Date: 1/27/2022  4:26 PM     Admitting Physician: Susan Berry MD the left foot with exposed metatarsal  Right foot postsurgical changes noted, remaining digits still dusky    HPI:     History obtained from patient and mother Anup Shanks.   Patient was recently released from [facility] after admission for COVID starvation ketoacidos anemia during his hospital stay.    Due to acute interstitial nephritis and Vanco toxicity he was started on dialysis via a tunneled catheter in the neck, he only needed this for approximately 1 week his renal function recovered and does no longer need dial prednisone taper per renal     Lamb Bite, B/L  Gangrene B/L Feet / Osteo on margins from OR Cultures with staph aureus  Amputation of left 5 digits and 3 digits on the right during this hospital stay-podiatry following following  WBC normal now   1 blood (Bilateral)     Imaging: IR TUNNELED CV CATH INSERTION EXCHANGE CHECK    Result Date: 1/25/2022  CONCLUSION: Surgical removal of tunneled dialysis PermCath     Dictated by (CST): Dany Ibrahim MD on 1/25/2022 at 2:51 PM     Finalized by (CST): Kiel Ramos 108 (90 Base) MCG/ACT Aers        STOP taking these medications    dextrose 5 % SOLN 100 mL with Piperacillin Sod-Tazobactam So 3.375 (3-0.375) g SOLR 3.375 g           Where to Get Your Medications      [facility]  Comments: ~ Obtain Silvadene Cream from Pharmacy   ~ Remove old dressings from bilateral feet   ~ Clean wound with saline or wound cleanser   ~ Apply Silvadene THINLY to DRY gauze   ~ Secure in place w ABD and kerlix rolls     Follow-up:    You will ne

## (undated) DEVICE — ABDOMINAL PAD: Brand: CURITY

## (undated) DEVICE — INTENDED FOR TISSUE SEPARATION, AND OTHER PROCEDURES THAT REQUIRE A SHARP SURGICAL BLADE TO PUNCTURE OR CUT.: Brand: BARD-PARKER ® STAINLESS STEEL BLADES

## (undated) DEVICE — IMPERVIOUS STOCKINETTE: Brand: DEROYAL

## (undated) DEVICE — NON-ADHERENT STRIPS,OIL EMULSION: Brand: CURITY

## (undated) DEVICE — PENCIL ESURG 10FT 3/32IN SS

## (undated) DEVICE — BANDAGE ROLL,100% COTTON, 6 PLY, LARGE: Brand: KERLIX

## (undated) DEVICE — SUCTION CANISTER, 3000CC,SAFELINER: Brand: DEROYAL

## (undated) DEVICE — SUPER SPONGES,MEDIUM: Brand: KERLIX

## (undated) DEVICE — ENCORE® LATEX ACCLAIM SIZE 7, STERILE LATEX POWDER-FREE SURGICAL GLOVE: Brand: ENCORE

## (undated) DEVICE — STANDARD HYPODERMIC NEEDLE,POLYPROPYLENE HUB: Brand: MONOJECT

## (undated) DEVICE — TRAY SKIN PREP PVP-1

## (undated) DEVICE — COVER SLV UNV DISP NTR STRL LF

## (undated) DEVICE — LOWER EXTREMITY: Brand: MEDLINE INDUSTRIES, INC.

## (undated) DEVICE — FAN SPRAY KIT: Brand: PULSAVAC®

## (undated) DEVICE — UNDYED BRAIDED (POLYGLACTIN 910), SYNTHETIC ABSORBABLE SUTURE: Brand: COATED VICRYL

## (undated) DEVICE — ENCORE® LATEX ACCLAIM SIZE 6.5, STERILE LATEX POWDER-FREE SURGICAL GLOVE: Brand: ENCORE

## (undated) DEVICE — SUTURE ETHILON 4-0 1667G

## (undated) DEVICE — SOL  .9 1000ML BTL

## (undated) NOTE — LETTER
[address]  [address] [place], [place]      Authorization for Surgical Operation and Procedure     Date:___________                                                                                                         Time:_______ transmission of diseases such as Hepatitis, AIDS and Cytomegalovirus (CMV) and fluid overload. In the event that I wish to have an autologous transfusion of my own blood, or a directed donor transfusion. I will discuss this with my physician.    5.   I au the DNAR order. 10. Patients having a sterilization procedure: I understand that if the procedure is successful the results will be permanent and it will therefore be impossible for me to inseminate, conceive, or bear children.   I also understand that the _____________________________  Lor Juni of Physician)                                                                                         (Date)                                   (Time)        Patient Name: Kristine Neves    : 1980   Printed:

## (undated) NOTE — LETTER
[facility], [place]  Authorization for Invasive Procedures  1.  I hereby authorize Dr. Joceline Silva , my physician and whomever may be designated as the doctor's assistant, to perform the following operation and/or procedure:  Christin Sampson occur: fever and allergic reactions, hemolytic reactions, transmission of disease such as hepatitis, AIDS, cytomegalovirus (CMV), and flluid overload.  In the event that I wish to have autologous transfusions of my own blood, or a directed donor transfusion Signature of Patient:  ________________________________________________ Date: _________Time: _________    Responsible person in case of minor or unconscious: _____________________________Relationship: ____________     Witness Signature: _______________

## (undated) NOTE — LETTER
[address] [place] [address]      Authorization for Surgical Operation and Procedure     Date:___________                                                                                                         Time:_______ may still be subject to ill effects as a result of receiving a blood transfusion and/or blood products.   The following are some, but not all, of the potential risks that can occur: fever and allergic reactions, hemolytic reactions, transmission of diseases the recovery period unless otherwise explicitly stated by me (or a person authorized to consent on my behalf). The surgeon or my attending physician will determine when the applicable recovery period ends for purposes of reinstating the DNAR order.   10. Pa or other significant relationship used in this procedure/surgery, I have disclosed this and had a discussion with my patient.     _______________________________________________________________ _____________________________  Andrew Orf of Physician)

## (undated) NOTE — IP AVS SNAPSHOT
[facility]            (For Outpatient Use Only) Initial Admit Date: 1/12/2022   Inpt/Obs Admit Date: Inpt: 1/13/22 / Obs: N/A   Discharge Date:    Miguel Knee:  [de-identified]   MRN: [de-identified]   CSN: [id number]   CEID: [id number]        NORMA Subscriber Name:  Joel Griggs :    Subscriber ID:  Pt Rel to Subscriber:    Hospital Account Financial Class: Medicaid Advantage    2022

## (undated) NOTE — LETTER
[address] [place] [address]      Authorization for Surgical Operation and Procedure     Date:___________                                                                                                         Time:_______ of diseases such as Hepatitis, AIDS and Cytomegalovirus (CMV) and fluid overload. In the event that I wish to have an autologous transfusion of my own blood, or a directed donor transfusion. I will discuss this with my physician.    5.   I authorize the u order. 10. Patients having a sterilization procedure: I understand that if the procedure is successful the results will be permanent and it will therefore be impossible for me to inseminate, conceive, or bear children.   I also understand that the procedur Physician)                                                                                         (Date)                                   (Time)

## (undated) NOTE — LETTER
[address] [facility]     I agree to have a Peripherally Inserted Central Catheter (PICC) placed in my arm.    1. The PICC insertion procedure, care, maintenance, risks, benefits, and complications have been explained to me b the PICC, including risks, benefits, and side effects related to the alternatives and risks related to not receiving this procedure.     8.  I have expressed any questions about this procedure to my physician or the PICC Proceduralist and he/she has answere
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
## (undated) NOTE — LETTER
[address], [place]  Authorization for Invasive Procedures  1.  I hereby authorize Dr. Ghulam Rodriguez , my physician and whomever may be designated as the doctor's assistant, to perform the following operation and/or procedure:  Dialysis ca and allergic reactions, hemolytic reactions, transmission of disease such as hepatitis, AIDS, cytomegalovirus (CMV), and flluid overload.  In the event that I wish to have autologous transfusions of my own blood, or a directed donor transfusion, I will disc Signature of Patient:  ________________________________________________ Date: _________Time: _________    Responsible person in case of minor or unconscious: _____________________________Relationship: ____________     Witness Signature: _______________

## (undated) NOTE — LETTER
[facility], [place]  Authorization for Invasive Procedures  1.  I hereby authorize Dr. Jeniffer Murray , my physician and whomever may be designated as the doctor's assistant, to perform the following operation and/or procedure:  inc following are some, but not all, of the potential risks that can occur: fever and allergic reactions, hemolytic reactions, transmission of disease such as hepatitis, AIDS, cytomegalovirus (CMV), and flluid overload.  In the event that I wish to have autolog as may be necessary or desirable in the judgment of my physician.      Signature of Patient:  ________________________________________________ Date: _________Time: _________    Responsible person in case of minor or unconscious: ____________________________

## (undated) NOTE — LETTER
[address], [place]  Authorization for Invasive Procedures  1.  I hereby authorize Dr. Ninoska King , my physician and whomever may be designated as the doctor's assistant, to perform the following operation and/or procedure:  Central judson risks that can occur: fever and allergic reactions, hemolytic reactions, transmission of disease such as hepatitis, AIDS, cytomegalovirus (CMV), and flluid overload.  In the event that I wish to have autologous transfusions of my own blood, or a directed do of my physician.      Signature of Patient:  ________________________________________________ Date: _________Time: _________    Responsible person in case of minor or unconscious: _____________________________Relationship: ____________     Witness Signature